# Patient Record
Sex: MALE | Race: BLACK OR AFRICAN AMERICAN | NOT HISPANIC OR LATINO | URBAN - METROPOLITAN AREA
[De-identification: names, ages, dates, MRNs, and addresses within clinical notes are randomized per-mention and may not be internally consistent; named-entity substitution may affect disease eponyms.]

---

## 2023-10-31 ENCOUNTER — INPATIENT (INPATIENT)
Facility: HOSPITAL | Age: 28
LOS: 9 days | Discharge: ANOTHER HOSPITAL | End: 2023-11-10
Attending: INTERNAL MEDICINE | Admitting: INTERNAL MEDICINE
Payer: OTHER GOVERNMENT

## 2023-10-31 VITALS
DIASTOLIC BLOOD PRESSURE: 93 MMHG | TEMPERATURE: 100 F | HEART RATE: 114 BPM | HEIGHT: 72 IN | WEIGHT: 220.02 LBS | RESPIRATION RATE: 19 BRPM | OXYGEN SATURATION: 97 % | SYSTOLIC BLOOD PRESSURE: 180 MMHG

## 2023-10-31 LAB
ALBUMIN SERPL ELPH-MCNC: 3.8 G/DL — SIGNIFICANT CHANGE UP (ref 3.3–5)
ALBUMIN SERPL ELPH-MCNC: 3.8 G/DL — SIGNIFICANT CHANGE UP (ref 3.3–5)
ALP SERPL-CCNC: 57 U/L — SIGNIFICANT CHANGE UP (ref 40–120)
ALP SERPL-CCNC: 57 U/L — SIGNIFICANT CHANGE UP (ref 40–120)
ALT FLD-CCNC: 87 U/L — HIGH (ref 12–78)
ALT FLD-CCNC: 87 U/L — HIGH (ref 12–78)
ANION GAP SERPL CALC-SCNC: 8 MMOL/L — SIGNIFICANT CHANGE UP (ref 5–17)
ANION GAP SERPL CALC-SCNC: 8 MMOL/L — SIGNIFICANT CHANGE UP (ref 5–17)
AST SERPL-CCNC: 32 U/L — SIGNIFICANT CHANGE UP (ref 15–37)
AST SERPL-CCNC: 32 U/L — SIGNIFICANT CHANGE UP (ref 15–37)
BASOPHILS # BLD AUTO: 0.04 K/UL — SIGNIFICANT CHANGE UP (ref 0–0.2)
BASOPHILS # BLD AUTO: 0.04 K/UL — SIGNIFICANT CHANGE UP (ref 0–0.2)
BASOPHILS NFR BLD AUTO: 0.3 % — SIGNIFICANT CHANGE UP (ref 0–2)
BASOPHILS NFR BLD AUTO: 0.3 % — SIGNIFICANT CHANGE UP (ref 0–2)
BILIRUB SERPL-MCNC: 0.2 MG/DL — SIGNIFICANT CHANGE UP (ref 0.2–1.2)
BILIRUB SERPL-MCNC: 0.2 MG/DL — SIGNIFICANT CHANGE UP (ref 0.2–1.2)
BUN SERPL-MCNC: 12 MG/DL — SIGNIFICANT CHANGE UP (ref 7–23)
BUN SERPL-MCNC: 12 MG/DL — SIGNIFICANT CHANGE UP (ref 7–23)
CALCIUM SERPL-MCNC: 9.1 MG/DL — SIGNIFICANT CHANGE UP (ref 8.5–10.1)
CALCIUM SERPL-MCNC: 9.1 MG/DL — SIGNIFICANT CHANGE UP (ref 8.5–10.1)
CHLORIDE SERPL-SCNC: 101 MMOL/L — SIGNIFICANT CHANGE UP (ref 96–108)
CHLORIDE SERPL-SCNC: 101 MMOL/L — SIGNIFICANT CHANGE UP (ref 96–108)
CO2 SERPL-SCNC: 30 MMOL/L — SIGNIFICANT CHANGE UP (ref 22–31)
CO2 SERPL-SCNC: 30 MMOL/L — SIGNIFICANT CHANGE UP (ref 22–31)
CREAT SERPL-MCNC: 1.35 MG/DL — HIGH (ref 0.5–1.3)
CREAT SERPL-MCNC: 1.35 MG/DL — HIGH (ref 0.5–1.3)
EGFR: 73 ML/MIN/1.73M2 — SIGNIFICANT CHANGE UP
EGFR: 73 ML/MIN/1.73M2 — SIGNIFICANT CHANGE UP
EOSINOPHIL # BLD AUTO: 0 K/UL — SIGNIFICANT CHANGE UP (ref 0–0.5)
EOSINOPHIL # BLD AUTO: 0 K/UL — SIGNIFICANT CHANGE UP (ref 0–0.5)
EOSINOPHIL NFR BLD AUTO: 0 % — SIGNIFICANT CHANGE UP (ref 0–6)
EOSINOPHIL NFR BLD AUTO: 0 % — SIGNIFICANT CHANGE UP (ref 0–6)
GLUCOSE SERPL-MCNC: 100 MG/DL — HIGH (ref 70–99)
GLUCOSE SERPL-MCNC: 100 MG/DL — HIGH (ref 70–99)
HCT VFR BLD CALC: 45 % — SIGNIFICANT CHANGE UP (ref 39–50)
HCT VFR BLD CALC: 45 % — SIGNIFICANT CHANGE UP (ref 39–50)
HGB BLD-MCNC: 14 G/DL — SIGNIFICANT CHANGE UP (ref 13–17)
HGB BLD-MCNC: 14 G/DL — SIGNIFICANT CHANGE UP (ref 13–17)
IMM GRANULOCYTES NFR BLD AUTO: 0.8 % — SIGNIFICANT CHANGE UP (ref 0–0.9)
IMM GRANULOCYTES NFR BLD AUTO: 0.8 % — SIGNIFICANT CHANGE UP (ref 0–0.9)
LYMPHOCYTES # BLD AUTO: 1.3 K/UL — SIGNIFICANT CHANGE UP (ref 1–3.3)
LYMPHOCYTES # BLD AUTO: 1.3 K/UL — SIGNIFICANT CHANGE UP (ref 1–3.3)
LYMPHOCYTES # BLD AUTO: 10.2 % — LOW (ref 13–44)
LYMPHOCYTES # BLD AUTO: 10.2 % — LOW (ref 13–44)
MCHC RBC-ENTMCNC: 26.4 PG — LOW (ref 27–34)
MCHC RBC-ENTMCNC: 26.4 PG — LOW (ref 27–34)
MCHC RBC-ENTMCNC: 31.1 G/DL — LOW (ref 32–36)
MCHC RBC-ENTMCNC: 31.1 G/DL — LOW (ref 32–36)
MCV RBC AUTO: 84.7 FL — SIGNIFICANT CHANGE UP (ref 80–100)
MCV RBC AUTO: 84.7 FL — SIGNIFICANT CHANGE UP (ref 80–100)
MONOCYTES # BLD AUTO: 1.3 K/UL — HIGH (ref 0–0.9)
MONOCYTES # BLD AUTO: 1.3 K/UL — HIGH (ref 0–0.9)
MONOCYTES NFR BLD AUTO: 10.2 % — SIGNIFICANT CHANGE UP (ref 2–14)
MONOCYTES NFR BLD AUTO: 10.2 % — SIGNIFICANT CHANGE UP (ref 2–14)
NEUTROPHILS # BLD AUTO: 10.02 K/UL — HIGH (ref 1.8–7.4)
NEUTROPHILS # BLD AUTO: 10.02 K/UL — HIGH (ref 1.8–7.4)
NEUTROPHILS NFR BLD AUTO: 78.5 % — HIGH (ref 43–77)
NEUTROPHILS NFR BLD AUTO: 78.5 % — HIGH (ref 43–77)
NRBC # BLD: 0 /100 WBCS — SIGNIFICANT CHANGE UP (ref 0–0)
NRBC # BLD: 0 /100 WBCS — SIGNIFICANT CHANGE UP (ref 0–0)
PLATELET # BLD AUTO: 290 K/UL — SIGNIFICANT CHANGE UP (ref 150–400)
PLATELET # BLD AUTO: 290 K/UL — SIGNIFICANT CHANGE UP (ref 150–400)
POTASSIUM SERPL-MCNC: 4.4 MMOL/L — SIGNIFICANT CHANGE UP (ref 3.5–5.3)
POTASSIUM SERPL-MCNC: 4.4 MMOL/L — SIGNIFICANT CHANGE UP (ref 3.5–5.3)
POTASSIUM SERPL-SCNC: 4.4 MMOL/L — SIGNIFICANT CHANGE UP (ref 3.5–5.3)
POTASSIUM SERPL-SCNC: 4.4 MMOL/L — SIGNIFICANT CHANGE UP (ref 3.5–5.3)
PROT SERPL-MCNC: 7.5 GM/DL — SIGNIFICANT CHANGE UP (ref 6–8.3)
PROT SERPL-MCNC: 7.5 GM/DL — SIGNIFICANT CHANGE UP (ref 6–8.3)
RBC # BLD: 5.31 M/UL — SIGNIFICANT CHANGE UP (ref 4.2–5.8)
RBC # BLD: 5.31 M/UL — SIGNIFICANT CHANGE UP (ref 4.2–5.8)
RBC # FLD: 15.4 % — HIGH (ref 10.3–14.5)
RBC # FLD: 15.4 % — HIGH (ref 10.3–14.5)
SODIUM SERPL-SCNC: 139 MMOL/L — SIGNIFICANT CHANGE UP (ref 135–145)
SODIUM SERPL-SCNC: 139 MMOL/L — SIGNIFICANT CHANGE UP (ref 135–145)
VALPROATE SERPL-MCNC: 85 UG/ML — SIGNIFICANT CHANGE UP (ref 50–100)
VALPROATE SERPL-MCNC: 85 UG/ML — SIGNIFICANT CHANGE UP (ref 50–100)
WBC # BLD: 12.76 K/UL — HIGH (ref 3.8–10.5)
WBC # BLD: 12.76 K/UL — HIGH (ref 3.8–10.5)
WBC # FLD AUTO: 12.76 K/UL — HIGH (ref 3.8–10.5)
WBC # FLD AUTO: 12.76 K/UL — HIGH (ref 3.8–10.5)

## 2023-10-31 PROCEDURE — 93010 ELECTROCARDIOGRAM REPORT: CPT

## 2023-10-31 PROCEDURE — 99285 EMERGENCY DEPT VISIT HI MDM: CPT

## 2023-10-31 PROCEDURE — 99222 1ST HOSP IP/OBS MODERATE 55: CPT

## 2023-10-31 PROCEDURE — 70450 CT HEAD/BRAIN W/O DYE: CPT | Mod: 26,MA

## 2023-10-31 RX ORDER — ACETAMINOPHEN 500 MG
650 TABLET ORAL EVERY 6 HOURS
Refills: 0 | Status: DISCONTINUED | OUTPATIENT
Start: 2023-10-31 | End: 2023-11-03

## 2023-10-31 RX ORDER — LANOLIN ALCOHOL/MO/W.PET/CERES
3 CREAM (GRAM) TOPICAL AT BEDTIME
Refills: 0 | Status: DISCONTINUED | OUTPATIENT
Start: 2023-10-31 | End: 2023-11-10

## 2023-10-31 RX ORDER — SODIUM CHLORIDE 9 MG/ML
1000 INJECTION INTRAMUSCULAR; INTRAVENOUS; SUBCUTANEOUS
Refills: 0 | Status: DISCONTINUED | OUTPATIENT
Start: 2023-10-31 | End: 2023-11-02

## 2023-10-31 RX ORDER — ENOXAPARIN SODIUM 100 MG/ML
40 INJECTION SUBCUTANEOUS EVERY 24 HOURS
Refills: 0 | Status: DISCONTINUED | OUTPATIENT
Start: 2023-10-31 | End: 2023-11-03

## 2023-10-31 RX ORDER — SODIUM CHLORIDE 9 MG/ML
1000 INJECTION INTRAMUSCULAR; INTRAVENOUS; SUBCUTANEOUS ONCE
Refills: 0 | Status: COMPLETED | OUTPATIENT
Start: 2023-10-31 | End: 2023-10-31

## 2023-10-31 RX ORDER — ONDANSETRON 8 MG/1
4 TABLET, FILM COATED ORAL EVERY 8 HOURS
Refills: 0 | Status: DISCONTINUED | OUTPATIENT
Start: 2023-10-31 | End: 2023-11-02

## 2023-10-31 RX ADMIN — SODIUM CHLORIDE 1000 MILLILITER(S): 9 INJECTION INTRAMUSCULAR; INTRAVENOUS; SUBCUTANEOUS at 22:28

## 2023-10-31 RX ADMIN — SODIUM CHLORIDE 1000 MILLILITER(S): 9 INJECTION INTRAMUSCULAR; INTRAVENOUS; SUBCUTANEOUS at 21:30

## 2023-10-31 NOTE — H&P ADULT - NSHPPHYSICALEXAM_GEN_ALL_CORE
PHYSICAL EXAM:  GENERAL: NAD, lying in bed comfortably  HEAD:  Atraumatic, Normocephalic  EYES: EOMI, PERRLA, conjunctiva and sclera clear  ENT: Moist mucous membranes  NECK: Supple, No JVD  CHEST/LUNG: Clear to auscultation bilaterally; No rales, rhonchi, wheezing, or rubs. Unlabored respirations  HEART: Regular rate and rhythm; No murmurs, rubs, or gallops  ABDOMEN: Bowel sounds present; Soft, Nontender, Nondistended. No hepatomegally  EXTREMITIES:  2+ Peripheral Pulses, brisk capillary refill. No clubbing, cyanosis, or edema  NERVOUS SYSTEM:  Alert & Oriented X2, speech clear. No deficits   MSK: FROM all 4 extremities, full and equal strength  SKIN: No rashes or lesions

## 2023-10-31 NOTE — ED ADULT NURSE NOTE - CHIEF COMPLAINT QUOTE
as per EMS " coming from home, seizures x 5  today, denies head injury, complaint with medications. Witnessed last seizure by EMS lasted 10 mins. Given versed 5mg IM and placed left 20g hand." [ Patient postictal in triage, noted no respiratory distress in triage. hx of seizures]

## 2023-10-31 NOTE — ED ADULT NURSE NOTE - NSFALLUNIVINTERV_ED_ALL_ED
Bed/Stretcher in lowest position, wheels locked, appropriate side rails in place/Call bell, personal items and telephone in reach/Instruct patient to call for assistance before getting out of bed/chair/stretcher/Non-slip footwear applied when patient is off stretcher/[place] to call system/Physically safe environment - no spills, clutter or unnecessary equipment/Purposeful proactive rounding/Room/bathroom lighting operational, light cord in reach

## 2023-10-31 NOTE — H&P ADULT - ASSESSMENT
Patient's aunt sitting at bedside. Patient is still in some post-ictal state, but he is able to answer some of the simple questions. Reportedly, he had tonic-clonic seizures x5 at home followed by post ictal state. No reported tongue bite or bowel/bladder incontinence, no head strike. His seizure had last for about 19 minutes. Had received versed 5mg IM in route by EMS. He denies headache, vomiting, neck pain or stiffness. Aunt says that his Brivaracetam has been recently changed with Oxcarbazepine 300 mg bid. He has been diagnosed with encephalitis and he is antibiotics and prednisone per his aunt. Patient is compliant on his all medications. Otherwise, he denies fever, chills, chest pain, sob, N/V, abdominal pain, diarrhea or dysuria.   He does vape but denies alcohol or substance abuse.      # Breakthrough seizure.  Valproic level 85 (normal range ) and Oxcarbazepine level pending.  CT head negative.  Admit to telemetry.  Vitals and neuro-checks per protocol.  Seizure precaution.  Follow Oxcarbazepine level.  EEG in am.  Continue his Depakote 750 mg bid, Vimpat 150 mg bid, Oxcarbazepine 300 mg bid.  Consult neurology in am.    # H/o recent Encephalitis.  Continue his Clarithromycin 500 mg bid, Amoxicillin 1000 mg bid, Bactrim-DS (800-160) 1 tab on Mon/Wed/Friday (3 days in a week). Prednisone 15 mg po daily.  Clarithromycin  is not available in in-patient pharmacy.  Need to bring his own medication in am.    # Mild LUIS CARLOS, likely dehydration.  Adequate hydration.  BMP in am.    # Mildly elevated ALT.  Denies alcohol abuse.  Will get acute hepatitis panel.    # GERD.  On Pantoprazole.    # DVT prophylaxis: Lovenox sq daily.    Plan of care d/w his aunt at bedside in detail, and she verbalized understanding.

## 2023-10-31 NOTE — ED ADULT TRIAGE NOTE - CHIEF COMPLAINT QUOTE
as per EMS " coming from home, seizures x 5  today, denies head injury, complaint with medications. Witnessed last seizure by EMS lasted 10 mins. Given versed 5mg IM and placed left 20g hand." [ Patient postictal in triage, noted no respiratory distress in triage. hx of seizures]
none

## 2023-10-31 NOTE — ED ADULT NURSE NOTE - OBJECTIVE STATEMENT
as per uncle pt was home and experienced seizure like activity @ around 1826. pt was standing when the seizure occurred and was placed into a chair by his uncle. pts uncle is unsure how long the seizure lasted. pt is compliant with seizure medication, but uncle stated there was a change in his medication and whenever his medication is changed he has a seizure.    unsure of pts PMH

## 2023-10-31 NOTE — ED PROVIDER NOTE - CLINICAL SUMMARY MEDICAL DECISION MAKING FREE TEXT BOX
26yo male with pmh encephalitis on prednisone, seizures, presenting with seizure.  Brought in by uncle.  Around 6p patient reportedly had gtc seizure witnessed by uncle.  Patient was standing and uncle saw eyes rolling back so he assisted him into chair where he seized.  Did not fall or hit head.  Seemed consistent with prior seizures.  Still postictal at this time, not responding to questions and history coming from uncle and aunt.  Thursday brivaracetam was unavailable at pharmacy so was switched to oxcarbazepine which family endorses compliance with.  Also takes lacosamide and depakote.  No fevers, was feeling well prior to this.  Will ct head for prolonged postictal state, check labs and levels.  Reassess and dispo per results and reassessments.

## 2023-10-31 NOTE — ED PROVIDER NOTE - PHYSICAL EXAMINATION
General appearance: Nontoxic appearing, conversant, afebrile    Eyes: anicteric sclerae, BELKIS, EOMI   HENT: Atraumatic; oropharynx clear, MMM and no ulcerations, no pharyngeal erythema or exudate   Neck: Trachea midline; Full range of motion, supple   Pulm: CTA bl, normal respiratory effort and no intercostal retractions, normal work of breathing   CV: RRR, No murmurs, rubs, or gallops   Abdomen: Soft, non-tender, non-distended; no guarding or rebound   Extremities: No peripheral edema, no gross deformities, FROM x4   Skin: Dry, normal temperature, turgor and texture; no rash   Psych: Appropriate affect, cooperative    Neuro: CN2-12 grossly intact, moving all extremities

## 2023-10-31 NOTE — ED ADULT NURSE NOTE - ED STAT RN HANDOFF DETAILS
Report endorsed to ED Hold RN luba Ndiaye. Safety checks compld this shift/Safety rounds completed hourly.  IV sites checked Q2+remains WDL. Meds given as ord with no s/s of adverse RXNs. Fall +skin precs in place. Any issues endorsed to oncoming RN for follow up.  ]

## 2023-10-31 NOTE — ED PROVIDER NOTE - PROGRESS NOTE DETAILS
Still postictal but has been improving.  Discussed with aunt at bedside and offered admission for prolonged postictal state and breakthrough seizures with recent med change, would prefer admission.

## 2023-10-31 NOTE — H&P ADULT - HISTORY OF PRESENT ILLNESS
Chief Complaints: Seizure.    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 Patient's aunt sitting at bedside. Patient is still in some post-ictal state, but he is able to answer some of the simple questions. Reportedly, he had tonic-clonic seizures x5 at home followed by post ictal state. No reported tongue bite or bowel/bladder incontinence, no head strike. His seizure had last for about 19 minutes. Had received versed 5mg IM in route by EMS. He denies headache, vomiting, neck pain or stiffness. Aunt says that his Brivaracetam has been recently changed with Oxcarbazepine 300 mg bid. He has been diagnosed with encephalitis and he is antibiotics and prednisone per his aunt. Patient is compliant on his all medications. Otherwise, he denies fever, chills, chest pain, sob, N/V, abdominal pain, diarrhea or dysuria.  He does vape but denies alcohol or substance abuse.  Sig labs: WBC 12.76k, N 78%, BUN 12, Cr 1.35, ALT 87, Valproic level 85 (normal range ) and Oxcarbazepine level pending.  CT head negative.    His current home medications per his aunt:  Depakote 750 mg bid  Vimpat 150 mg bid  Oxcarbazepine 300 mg bid  Clarithromycin 500 mg bid  Amoxicillin 1000 mg bid  Bactrim-DS (800-160) 1 tab on Mon/Wed/Friday (3 days in a week).  Prednisone 15 mg po daily.  Pantoprazole 40 mg daily.  Vit B12 1000 IU on Mon/Wed/Friday (3 days in a week).

## 2023-10-31 NOTE — H&P ADULT - NSHPLABSRESULTS_GEN_ALL_CORE
LABS:                        14.0   12.76 )-----------( 290      ( 31 Oct 2023 21:14 )             45.0     10-31    139  |  101  |  12  ----------------------------<  100<H>  4.4   |  30  |  1.35<H>    Ca    9.1      31 Oct 2023 21:14    TPro  7.5  /  Alb  3.8  /  TBili  0.2  /  DBili  x   /  AST  32  /  ALT  87<H>  /  AlkPhos  57  10-31        < from: CT Head No Cont (10.31.23 @ 20:55) >        IMPRESSION:    No acute intracranial bleeding.    --- End of Report ---    < end of copied text >

## 2023-11-01 DIAGNOSIS — Z90.89 ACQUIRED ABSENCE OF OTHER ORGANS: Chronic | ICD-10-CM

## 2023-11-01 DIAGNOSIS — N17.9 ACUTE KIDNEY FAILURE, UNSPECIFIED: ICD-10-CM

## 2023-11-01 DIAGNOSIS — Z29.9 ENCOUNTER FOR PROPHYLACTIC MEASURES, UNSPECIFIED: ICD-10-CM

## 2023-11-01 DIAGNOSIS — R45.1 RESTLESSNESS AND AGITATION: ICD-10-CM

## 2023-11-01 DIAGNOSIS — G04.90 ENCEPHALITIS AND ENCEPHALOMYELITIS, UNSPECIFIED: ICD-10-CM

## 2023-11-01 DIAGNOSIS — R56.9 UNSPECIFIED CONVULSIONS: ICD-10-CM

## 2023-11-01 DIAGNOSIS — R79.89 OTHER SPECIFIED ABNORMAL FINDINGS OF BLOOD CHEMISTRY: ICD-10-CM

## 2023-11-01 LAB
ALBUMIN SERPL ELPH-MCNC: 3.4 G/DL — SIGNIFICANT CHANGE UP (ref 3.3–5)
ALBUMIN SERPL ELPH-MCNC: 3.4 G/DL — SIGNIFICANT CHANGE UP (ref 3.3–5)
ALP SERPL-CCNC: 53 U/L — SIGNIFICANT CHANGE UP (ref 40–120)
ALP SERPL-CCNC: 53 U/L — SIGNIFICANT CHANGE UP (ref 40–120)
ALT FLD-CCNC: 84 U/L — HIGH (ref 12–78)
ALT FLD-CCNC: 84 U/L — HIGH (ref 12–78)
ANION GAP SERPL CALC-SCNC: 6 MMOL/L — SIGNIFICANT CHANGE UP (ref 5–17)
ANION GAP SERPL CALC-SCNC: 6 MMOL/L — SIGNIFICANT CHANGE UP (ref 5–17)
APPEARANCE UR: ABNORMAL
APPEARANCE UR: ABNORMAL
AST SERPL-CCNC: 46 U/L — HIGH (ref 15–37)
AST SERPL-CCNC: 46 U/L — HIGH (ref 15–37)
BACTERIA # UR AUTO: ABNORMAL /HPF
BACTERIA # UR AUTO: ABNORMAL /HPF
BILIRUB SERPL-MCNC: 0.4 MG/DL — SIGNIFICANT CHANGE UP (ref 0.2–1.2)
BILIRUB SERPL-MCNC: 0.4 MG/DL — SIGNIFICANT CHANGE UP (ref 0.2–1.2)
BILIRUB UR-MCNC: NEGATIVE — SIGNIFICANT CHANGE UP
BILIRUB UR-MCNC: NEGATIVE — SIGNIFICANT CHANGE UP
BUN SERPL-MCNC: 12 MG/DL — SIGNIFICANT CHANGE UP (ref 7–23)
BUN SERPL-MCNC: 12 MG/DL — SIGNIFICANT CHANGE UP (ref 7–23)
CALCIUM SERPL-MCNC: 9 MG/DL — SIGNIFICANT CHANGE UP (ref 8.5–10.1)
CALCIUM SERPL-MCNC: 9 MG/DL — SIGNIFICANT CHANGE UP (ref 8.5–10.1)
CHLORIDE SERPL-SCNC: 107 MMOL/L — SIGNIFICANT CHANGE UP (ref 96–108)
CHLORIDE SERPL-SCNC: 107 MMOL/L — SIGNIFICANT CHANGE UP (ref 96–108)
CK SERPL-CCNC: 301 U/L — SIGNIFICANT CHANGE UP (ref 26–308)
CK SERPL-CCNC: 301 U/L — SIGNIFICANT CHANGE UP (ref 26–308)
CO2 SERPL-SCNC: 28 MMOL/L — SIGNIFICANT CHANGE UP (ref 22–31)
CO2 SERPL-SCNC: 28 MMOL/L — SIGNIFICANT CHANGE UP (ref 22–31)
COLOR SPEC: YELLOW — SIGNIFICANT CHANGE UP
COLOR SPEC: YELLOW — SIGNIFICANT CHANGE UP
CREAT SERPL-MCNC: 1.37 MG/DL — HIGH (ref 0.5–1.3)
CREAT SERPL-MCNC: 1.37 MG/DL — HIGH (ref 0.5–1.3)
DIFF PNL FLD: ABNORMAL
DIFF PNL FLD: ABNORMAL
EGFR: 71 ML/MIN/1.73M2 — SIGNIFICANT CHANGE UP
EGFR: 71 ML/MIN/1.73M2 — SIGNIFICANT CHANGE UP
EPI CELLS # UR: PRESENT
EPI CELLS # UR: PRESENT
GLUCOSE SERPL-MCNC: 114 MG/DL — HIGH (ref 70–99)
GLUCOSE SERPL-MCNC: 114 MG/DL — HIGH (ref 70–99)
GLUCOSE UR QL: NEGATIVE MG/DL — SIGNIFICANT CHANGE UP
GLUCOSE UR QL: NEGATIVE MG/DL — SIGNIFICANT CHANGE UP
HAV IGM SER-ACNC: SIGNIFICANT CHANGE UP
HAV IGM SER-ACNC: SIGNIFICANT CHANGE UP
HBV CORE IGM SER-ACNC: SIGNIFICANT CHANGE UP
HBV CORE IGM SER-ACNC: SIGNIFICANT CHANGE UP
HBV SURFACE AG SER-ACNC: SIGNIFICANT CHANGE UP
HBV SURFACE AG SER-ACNC: SIGNIFICANT CHANGE UP
HCT VFR BLD CALC: 44.5 % — SIGNIFICANT CHANGE UP (ref 39–50)
HCT VFR BLD CALC: 44.5 % — SIGNIFICANT CHANGE UP (ref 39–50)
HCV AB S/CO SERPL IA: 0.06 S/CO — SIGNIFICANT CHANGE UP (ref 0–0.99)
HCV AB S/CO SERPL IA: 0.06 S/CO — SIGNIFICANT CHANGE UP (ref 0–0.99)
HCV AB SERPL-IMP: SIGNIFICANT CHANGE UP
HCV AB SERPL-IMP: SIGNIFICANT CHANGE UP
HGB BLD-MCNC: 14.1 G/DL — SIGNIFICANT CHANGE UP (ref 13–17)
HGB BLD-MCNC: 14.1 G/DL — SIGNIFICANT CHANGE UP (ref 13–17)
KETONES UR-MCNC: NEGATIVE MG/DL — SIGNIFICANT CHANGE UP
KETONES UR-MCNC: NEGATIVE MG/DL — SIGNIFICANT CHANGE UP
LEUKOCYTE ESTERASE UR-ACNC: ABNORMAL
LEUKOCYTE ESTERASE UR-ACNC: ABNORMAL
MAGNESIUM SERPL-MCNC: 2.2 MG/DL — SIGNIFICANT CHANGE UP (ref 1.6–2.6)
MAGNESIUM SERPL-MCNC: 2.2 MG/DL — SIGNIFICANT CHANGE UP (ref 1.6–2.6)
MCHC RBC-ENTMCNC: 26.9 PG — LOW (ref 27–34)
MCHC RBC-ENTMCNC: 26.9 PG — LOW (ref 27–34)
MCHC RBC-ENTMCNC: 31.7 G/DL — LOW (ref 32–36)
MCHC RBC-ENTMCNC: 31.7 G/DL — LOW (ref 32–36)
MCV RBC AUTO: 84.9 FL — SIGNIFICANT CHANGE UP (ref 80–100)
MCV RBC AUTO: 84.9 FL — SIGNIFICANT CHANGE UP (ref 80–100)
NITRITE UR-MCNC: POSITIVE
NITRITE UR-MCNC: POSITIVE
NRBC # BLD: 0 /100 WBCS — SIGNIFICANT CHANGE UP (ref 0–0)
NRBC # BLD: 0 /100 WBCS — SIGNIFICANT CHANGE UP (ref 0–0)
PH UR: 6.5 — SIGNIFICANT CHANGE UP (ref 5–8)
PH UR: 6.5 — SIGNIFICANT CHANGE UP (ref 5–8)
PLATELET # BLD AUTO: 306 K/UL — SIGNIFICANT CHANGE UP (ref 150–400)
PLATELET # BLD AUTO: 306 K/UL — SIGNIFICANT CHANGE UP (ref 150–400)
POTASSIUM SERPL-MCNC: 3.8 MMOL/L — SIGNIFICANT CHANGE UP (ref 3.5–5.3)
POTASSIUM SERPL-MCNC: 3.8 MMOL/L — SIGNIFICANT CHANGE UP (ref 3.5–5.3)
POTASSIUM SERPL-SCNC: 3.8 MMOL/L — SIGNIFICANT CHANGE UP (ref 3.5–5.3)
POTASSIUM SERPL-SCNC: 3.8 MMOL/L — SIGNIFICANT CHANGE UP (ref 3.5–5.3)
PROLACTIN SERPL-MCNC: 25.2 NG/ML — HIGH (ref 4.1–18.4)
PROLACTIN SERPL-MCNC: 25.2 NG/ML — HIGH (ref 4.1–18.4)
PROT SERPL-MCNC: 7.3 GM/DL — SIGNIFICANT CHANGE UP (ref 6–8.3)
PROT SERPL-MCNC: 7.3 GM/DL — SIGNIFICANT CHANGE UP (ref 6–8.3)
PROT UR-MCNC: 30 MG/DL
PROT UR-MCNC: 30 MG/DL
RBC # BLD: 5.24 M/UL — SIGNIFICANT CHANGE UP (ref 4.2–5.8)
RBC # BLD: 5.24 M/UL — SIGNIFICANT CHANGE UP (ref 4.2–5.8)
RBC # FLD: 15.7 % — HIGH (ref 10.3–14.5)
RBC # FLD: 15.7 % — HIGH (ref 10.3–14.5)
RBC CASTS # UR COMP ASSIST: PRESENT /HPF
RBC CASTS # UR COMP ASSIST: PRESENT /HPF
SODIUM SERPL-SCNC: 141 MMOL/L — SIGNIFICANT CHANGE UP (ref 135–145)
SODIUM SERPL-SCNC: 141 MMOL/L — SIGNIFICANT CHANGE UP (ref 135–145)
SP GR SPEC: 1.03 — SIGNIFICANT CHANGE UP (ref 1–1.03)
SP GR SPEC: 1.03 — SIGNIFICANT CHANGE UP (ref 1–1.03)
UROBILINOGEN FLD QL: 0.2 MG/DL — SIGNIFICANT CHANGE UP (ref 0.2–1)
UROBILINOGEN FLD QL: 0.2 MG/DL — SIGNIFICANT CHANGE UP (ref 0.2–1)
WBC # BLD: 9.6 K/UL — SIGNIFICANT CHANGE UP (ref 3.8–10.5)
WBC # BLD: 9.6 K/UL — SIGNIFICANT CHANGE UP (ref 3.8–10.5)
WBC # FLD AUTO: 9.6 K/UL — SIGNIFICANT CHANGE UP (ref 3.8–10.5)
WBC # FLD AUTO: 9.6 K/UL — SIGNIFICANT CHANGE UP (ref 3.8–10.5)
WBC UR QL: >25 /HPF — SIGNIFICANT CHANGE UP (ref 0–5)
WBC UR QL: >25 /HPF — SIGNIFICANT CHANGE UP (ref 0–5)

## 2023-11-01 PROCEDURE — 99233 SBSQ HOSP IP/OBS HIGH 50: CPT

## 2023-11-01 RX ORDER — DIVALPROEX SODIUM 500 MG/1
750 TABLET, DELAYED RELEASE ORAL
Refills: 0 | Status: DISCONTINUED | OUTPATIENT
Start: 2023-11-01 | End: 2023-11-10

## 2023-11-01 RX ORDER — HALOPERIDOL DECANOATE 100 MG/ML
2 INJECTION INTRAMUSCULAR ONCE
Refills: 0 | Status: COMPLETED | OUTPATIENT
Start: 2023-11-01 | End: 2023-11-01

## 2023-11-01 RX ORDER — PREGABALIN 225 MG/1
1000 CAPSULE ORAL DAILY
Refills: 0 | Status: DISCONTINUED | OUTPATIENT
Start: 2023-11-01 | End: 2023-11-10

## 2023-11-01 RX ORDER — AMOXICILLIN 250 MG/5ML
1000 SUSPENSION, RECONSTITUTED, ORAL (ML) ORAL
Refills: 0 | Status: DISCONTINUED | OUTPATIENT
Start: 2023-11-01 | End: 2023-11-02

## 2023-11-01 RX ORDER — OXCARBAZEPINE 300 MG/1
300 TABLET, FILM COATED ORAL
Refills: 0 | Status: DISCONTINUED | OUTPATIENT
Start: 2023-11-01 | End: 2023-11-10

## 2023-11-01 RX ORDER — CLARITHROMYCIN 500 MG
500 TABLET ORAL
Refills: 0 | Status: DISCONTINUED | OUTPATIENT
Start: 2023-11-01 | End: 2023-11-02

## 2023-11-01 RX ORDER — HALOPERIDOL DECANOATE 100 MG/ML
2 INJECTION INTRAMUSCULAR EVERY 6 HOURS
Refills: 0 | Status: DISCONTINUED | OUTPATIENT
Start: 2023-11-01 | End: 2023-11-04

## 2023-11-01 RX ORDER — BRIVARACETAM 25 MG/1
100 TABLET, FILM COATED ORAL
Refills: 0 | Status: DISCONTINUED | OUTPATIENT
Start: 2023-11-01 | End: 2023-11-10

## 2023-11-01 RX ORDER — PANTOPRAZOLE SODIUM 20 MG/1
40 TABLET, DELAYED RELEASE ORAL
Refills: 0 | Status: DISCONTINUED | OUTPATIENT
Start: 2023-11-01 | End: 2023-11-10

## 2023-11-01 RX ORDER — LACOSAMIDE 50 MG/1
150 TABLET ORAL
Refills: 0 | Status: DISCONTINUED | OUTPATIENT
Start: 2023-11-01 | End: 2023-11-10

## 2023-11-01 RX ADMIN — OXCARBAZEPINE 300 MILLIGRAM(S): 300 TABLET, FILM COATED ORAL at 05:44

## 2023-11-01 RX ADMIN — SODIUM CHLORIDE 125 MILLILITER(S): 9 INJECTION INTRAMUSCULAR; INTRAVENOUS; SUBCUTANEOUS at 03:35

## 2023-11-01 RX ADMIN — Medication 500 MILLIGRAM(S): at 18:03

## 2023-11-01 RX ADMIN — BRIVARACETAM 100 MILLIGRAM(S): 25 TABLET, FILM COATED ORAL at 18:01

## 2023-11-01 RX ADMIN — OXCARBAZEPINE 300 MILLIGRAM(S): 300 TABLET, FILM COATED ORAL at 18:03

## 2023-11-01 RX ADMIN — HALOPERIDOL DECANOATE 2 MILLIGRAM(S): 100 INJECTION INTRAMUSCULAR at 16:15

## 2023-11-01 RX ADMIN — Medication 1000 MILLIGRAM(S): at 18:02

## 2023-11-01 RX ADMIN — PANTOPRAZOLE SODIUM 40 MILLIGRAM(S): 20 TABLET, DELAYED RELEASE ORAL at 05:44

## 2023-11-01 RX ADMIN — Medication 2 MILLIGRAM(S): at 07:53

## 2023-11-01 RX ADMIN — LACOSAMIDE 150 MILLIGRAM(S): 50 TABLET ORAL at 05:44

## 2023-11-01 RX ADMIN — Medication 1000 MILLIGRAM(S): at 05:44

## 2023-11-01 RX ADMIN — Medication 15 MILLIGRAM(S): at 05:44

## 2023-11-01 RX ADMIN — Medication 2 MILLIGRAM(S): at 08:53

## 2023-11-01 RX ADMIN — HALOPERIDOL DECANOATE 2 MILLIGRAM(S): 100 INJECTION INTRAMUSCULAR at 10:31

## 2023-11-01 RX ADMIN — LACOSAMIDE 150 MILLIGRAM(S): 50 TABLET ORAL at 18:03

## 2023-11-01 RX ADMIN — DIVALPROEX SODIUM 750 MILLIGRAM(S): 500 TABLET, DELAYED RELEASE ORAL at 05:44

## 2023-11-01 RX ADMIN — DIVALPROEX SODIUM 750 MILLIGRAM(S): 500 TABLET, DELAYED RELEASE ORAL at 18:02

## 2023-11-01 RX ADMIN — ENOXAPARIN SODIUM 40 MILLIGRAM(S): 100 INJECTION SUBCUTANEOUS at 05:44

## 2023-11-01 NOTE — CONSULT NOTE ADULT - ASSESSMENT
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

## 2023-11-01 NOTE — PROGRESS NOTE ADULT - SUBJECTIVE AND OBJECTIVE BOX
PROGRESS NOTE:     Patient is a 28y old  Male who presents with a chief complaint of Breakthrough seizure. (31 Oct 2023 22:43)      SUBJECTIVE / OVERNIGHT EVENTS: Pt confused and agitated.     ADDITIONAL REVIEW OF SYSTEMS:    MEDICATIONS  (STANDING):  amoxicillin 1000 milliGRAM(s) Oral two times a day  cyanocobalamin 1000 MICROGram(s) Oral daily  divalproex  milliGRAM(s) Oral two times a day  enoxaparin Injectable 40 milliGRAM(s) SubCutaneous every 24 hours  lacosamide 150 milliGRAM(s) Oral two times a day  OXcarbazepine 300 milliGRAM(s) Oral two times a day  pantoprazole    Tablet 40 milliGRAM(s) Oral before breakfast  predniSONE   Tablet 15 milliGRAM(s) Oral daily  sodium chloride 0.9%. 1000 milliLiter(s) (125 mL/Hr) IV Continuous <Continuous>  trimethoprim  160 mG/sulfamethoxazole 800 mG 1 Tablet(s) Oral <User Schedule>    MEDICATIONS  (PRN):  acetaminophen     Tablet .. 650 milliGRAM(s) Oral every 6 hours PRN Temp greater or equal to 38C (100.4F), Mild Pain (1 - 3)  aluminum hydroxide/magnesium hydroxide/simethicone Suspension 30 milliLiter(s) Oral every 4 hours PRN Dyspepsia  haloperidol    Injectable 2 milliGRAM(s) IntraMuscular every 6 hours PRN Agitation  LORazepam    IVPB 2 milliGRAM(s) IV Intermittent every 2 hours PRN Seizure  melatonin 3 milliGRAM(s) Oral at bedtime PRN Insomnia  ondansetron Injectable 4 milliGRAM(s) IV Push every 8 hours PRN Nausea and/or Vomiting      CAPILLARY BLOOD GLUCOSE      POCT Blood Glucose.: 94 mg/dL (31 Oct 2023 21:52)    I&O's Summary    01 Nov 2023 07:01  -  01 Nov 2023 14:03  --------------------------------------------------------  IN: 420 mL / OUT: 0 mL / NET: 420 mL        PHYSICAL EXAM:  Vital Signs Last 24 Hrs  T(C): 36.3 (01 Nov 2023 02:47), Max: 37.6 (31 Oct 2023 19:56)  T(F): 97.4 (01 Nov 2023 02:47), Max: 99.7 (31 Oct 2023 19:56)  HR: 68 (01 Nov 2023 07:54) (61 - 114)  BP: 117/82 (01 Nov 2023 07:54) (102/64 - 180/93)  BP(mean): --  RR: 18 (01 Nov 2023 07:54) (15 - 19)  SpO2: 99% (01 Nov 2023 07:54) (97% - 100%)    Parameters below as of 31 Oct 2023 22:46  Patient On (Oxygen Delivery Method): room air      GENERAL: Restless   EYES: EOMI, PERRLA, conjunctiva and sclera clear  ENT: Moist mucous membranes  NECK: Supple, No JVD  CHEST/LUNG: Clear to auscultation bilaterally; No rales, rhonchi, wheezing, or rubs. Unlabored respirations  HEART: Regular rate and rhythm; No murmurs, rubs, or gallops  ABDOMEN: Bowel sounds present; Soft, Nontender, Nondistended. No hepatomegally  EXTREMITIES:  2+ Peripheral Pulses, brisk capillary refill. No clubbing, cyanosis, or edema  NERVOUS SYSTEM:  Alert & Oriented X1-2, speech slurred   MSK: FROM all 4 extremities, full and equal strength  SKIN: No rashes or lesions    LABS:                        14.1   9.60  )-----------( 306      ( 01 Nov 2023 10:05 )             44.5     11-01    141  |  107  |  12  ----------------------------<  114<H>  3.8   |  28  |  1.37<H>    Ca    9.0      01 Nov 2023 10:05  Mg     2.2     11-01    TPro  7.3  /  Alb  3.4  /  TBili  0.4  /  DBili  x   /  AST  46<H>  /  ALT  84<H>  /  AlkPhos  53  11-01      CARDIAC MARKERS ( 01 Nov 2023 10:05 )  x     / x     / 301 U/L / x     / x          Urinalysis Basic - ( 01 Nov 2023 10:05 )    Color: x / Appearance: x / SG: x / pH: x  Gluc: 114 mg/dL / Ketone: x  / Bili: x / Urobili: x   Blood: x / Protein: x / Nitrite: x   Leuk Esterase: x / RBC: x / WBC x   Sq Epi: x / Non Sq Epi: x / Bacteria: x          RADIOLOGY & ADDITIONAL TESTS:  Results Reviewed:   Imaging Personally Reviewed:  Electrocardiogram Personally Reviewed:    COORDINATION OF CARE:  Care Discussed with Consultants/Other Providers [Y/N]:  Prior or Outpatient Records Reviewed [Y/N]:

## 2023-11-01 NOTE — CONSULT NOTE ADULT - SUBJECTIVE AND OBJECTIVE BOX
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 His aunt, Estuardo, provided the history. Prior to 12/2022, patient never had a seizure. His first seizure was while he was in [place] and had to be transported back to [place] [place]"" for care. He was put in a coma for 1 month. Patient follows with a neurologist in [place]. Last seizure was over the summer. As per aunt, Akilah came to visit her and he was perfectly fine (not ill, no cognitive impairment, focal weakness).     *Patient was switched from Briviact 100mg BID to Trileptal 300mg BID on 10/26/23 because the pharmacy did not have Briviact.    PMHx: ?autoimmune encephalitis, Epilepsy  PSHx: tonsillectomy  PFHx: none  Social Hx: Vapes, no etoh  Allergies: NKDA  ROS: unable to obtain due to AMS  Medications: see EMR    Vitals: Temp 99.7F       RR 19              /93  General: NAD  CV: regular rate and rhythm  Resp: no respiratory distress  Neck: supple  Neuro Exam: Somnolent. Opens eyes to voice. Mumbles. Follows simple commands. No dysarthria. EOM intact. No facial droop. PERRL. BTT. Moving all four extremities spontaneously against gravity. No focal weakness. Reflexes symmetric and toes down. Gait exam deferred. Sensory intact to touch.     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 His aunt, Estuardo, provided the history. Prior to 12/2022, patient never had a seizure. His first seizure was while he was in [place] and had to be transported back to [facility] for care. He was put in a coma for 1 month. Patient follows with a neurologist in [place]. Last seizure was over the summer. As per aunt, Akilah came to visit her and he was perfectly fine (not ill, no cognitive impairment, focal weakness).     *Patient was switched from Briviact 100mg BID to Trileptal 300mg BID on 10/26/23 because the pharmacy did not have Briviact.    PMHx: ?autoimmune encephalitis, Epilepsy  PSHx: tonsillectomy  PFHx: none  Social Hx: Vapes, no etoh  Allergies: NKDA  ROS: unable to obtain due to AMS  Medications: see EMR    Vitals: Temp 99.7F       RR 19              /93  General: NAD  CV: regular rate and rhythm  Resp: no respiratory distress  Neck: supple  Neuro Exam: Somnolent. Opens eyes to voice. Mumbles. Follows simple commands. No dysarthria. EOM intact. No facial droop. PERRL. BTT. Moving all four extremities spontaneously against gravity. No focal weakness. Reflexes symmetric and toes down. Gait exam deferred. Sensory intact to touch.     NIHSS- 11    CT head and labs reviewed

## 2023-11-01 NOTE — PROGRESS NOTE ADULT - ASSESSMENT
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 Currently post-ictal and agitated

## 2023-11-01 NOTE — PATIENT PROFILE ADULT - FUNCTIONAL SCREEN CURRENT LEVEL: COMMUNICATION, MLM
3 = unable to understand or speak (not related to language barrier) 2 = difficulty understanding and speaking (not related to language barrier)

## 2023-11-01 NOTE — PATIENT PROFILE ADULT - FALL HARM RISK - HARM RISK INTERVENTIONS
Assistance with ambulation/Assistance OOB with selected safe patient handling equipment/Communicate Risk of Fall with Harm to all staff/Discuss with provider need for PT consult/Monitor for mental status changes/Monitor gait and stability/Move patient closer to nurses' station/Reinforce activity limits and safety measures with patient and family/Reorient to person, place and time as needed/Tailored Fall Risk Interventions/Toileting schedule using arm’s reach rule for commode and bathroom/Use of alarms - bed, chair and/or voice tab/Visual Cue: Yellow wristband and red socks/Bed in lowest position, wheels locked, appropriate side rails in place/Call bell, personal items and telephone in reach/Instruct patient to call for assistance before getting out of bed or chair/Non-slip footwear when patient is out of bed/[place] to call system/Physically safe environment - no spills, clutter or unnecessary equipment/Purposeful Proactive Rounding/Room/bathroom lighting operational, light cord in reach

## 2023-11-02 LAB
ALBUMIN SERPL ELPH-MCNC: 3.4 G/DL — SIGNIFICANT CHANGE UP (ref 3.3–5)
ALBUMIN SERPL ELPH-MCNC: 3.4 G/DL — SIGNIFICANT CHANGE UP (ref 3.3–5)
ALP SERPL-CCNC: 56 U/L — SIGNIFICANT CHANGE UP (ref 40–120)
ALP SERPL-CCNC: 56 U/L — SIGNIFICANT CHANGE UP (ref 40–120)
ALT FLD-CCNC: 94 U/L — HIGH (ref 12–78)
ALT FLD-CCNC: 94 U/L — HIGH (ref 12–78)
ANION GAP SERPL CALC-SCNC: 5 MMOL/L — SIGNIFICANT CHANGE UP (ref 5–17)
ANION GAP SERPL CALC-SCNC: 5 MMOL/L — SIGNIFICANT CHANGE UP (ref 5–17)
AST SERPL-CCNC: 68 U/L — HIGH (ref 15–37)
AST SERPL-CCNC: 68 U/L — HIGH (ref 15–37)
BILIRUB SERPL-MCNC: 0.4 MG/DL — SIGNIFICANT CHANGE UP (ref 0.2–1.2)
BILIRUB SERPL-MCNC: 0.4 MG/DL — SIGNIFICANT CHANGE UP (ref 0.2–1.2)
BUN SERPL-MCNC: 11 MG/DL — SIGNIFICANT CHANGE UP (ref 7–23)
BUN SERPL-MCNC: 11 MG/DL — SIGNIFICANT CHANGE UP (ref 7–23)
CALCIUM SERPL-MCNC: 9.1 MG/DL — SIGNIFICANT CHANGE UP (ref 8.5–10.1)
CALCIUM SERPL-MCNC: 9.1 MG/DL — SIGNIFICANT CHANGE UP (ref 8.5–10.1)
CHLORIDE SERPL-SCNC: 106 MMOL/L — SIGNIFICANT CHANGE UP (ref 96–108)
CHLORIDE SERPL-SCNC: 106 MMOL/L — SIGNIFICANT CHANGE UP (ref 96–108)
CO2 SERPL-SCNC: 31 MMOL/L — SIGNIFICANT CHANGE UP (ref 22–31)
CO2 SERPL-SCNC: 31 MMOL/L — SIGNIFICANT CHANGE UP (ref 22–31)
CREAT SERPL-MCNC: 1.05 MG/DL — SIGNIFICANT CHANGE UP (ref 0.5–1.3)
CREAT SERPL-MCNC: 1.05 MG/DL — SIGNIFICANT CHANGE UP (ref 0.5–1.3)
EGFR: 98 ML/MIN/1.73M2 — SIGNIFICANT CHANGE UP
EGFR: 98 ML/MIN/1.73M2 — SIGNIFICANT CHANGE UP
GLUCOSE SERPL-MCNC: 81 MG/DL — SIGNIFICANT CHANGE UP (ref 70–99)
GLUCOSE SERPL-MCNC: 81 MG/DL — SIGNIFICANT CHANGE UP (ref 70–99)
HCT VFR BLD CALC: 46.9 % — SIGNIFICANT CHANGE UP (ref 39–50)
HCT VFR BLD CALC: 46.9 % — SIGNIFICANT CHANGE UP (ref 39–50)
HGB BLD-MCNC: 14 G/DL — SIGNIFICANT CHANGE UP (ref 13–17)
HGB BLD-MCNC: 14 G/DL — SIGNIFICANT CHANGE UP (ref 13–17)
MCHC RBC-ENTMCNC: 25.9 PG — LOW (ref 27–34)
MCHC RBC-ENTMCNC: 25.9 PG — LOW (ref 27–34)
MCHC RBC-ENTMCNC: 29.9 G/DL — LOW (ref 32–36)
MCHC RBC-ENTMCNC: 29.9 G/DL — LOW (ref 32–36)
MCV RBC AUTO: 86.7 FL — SIGNIFICANT CHANGE UP (ref 80–100)
MCV RBC AUTO: 86.7 FL — SIGNIFICANT CHANGE UP (ref 80–100)
NRBC # BLD: 0 /100 WBCS — SIGNIFICANT CHANGE UP (ref 0–0)
NRBC # BLD: 0 /100 WBCS — SIGNIFICANT CHANGE UP (ref 0–0)
PLATELET # BLD AUTO: 286 K/UL — SIGNIFICANT CHANGE UP (ref 150–400)
PLATELET # BLD AUTO: 286 K/UL — SIGNIFICANT CHANGE UP (ref 150–400)
POTASSIUM SERPL-MCNC: 3.5 MMOL/L — SIGNIFICANT CHANGE UP (ref 3.5–5.3)
POTASSIUM SERPL-MCNC: 3.5 MMOL/L — SIGNIFICANT CHANGE UP (ref 3.5–5.3)
POTASSIUM SERPL-SCNC: 3.5 MMOL/L — SIGNIFICANT CHANGE UP (ref 3.5–5.3)
POTASSIUM SERPL-SCNC: 3.5 MMOL/L — SIGNIFICANT CHANGE UP (ref 3.5–5.3)
PROT SERPL-MCNC: 6.9 GM/DL — SIGNIFICANT CHANGE UP (ref 6–8.3)
PROT SERPL-MCNC: 6.9 GM/DL — SIGNIFICANT CHANGE UP (ref 6–8.3)
RBC # BLD: 5.41 M/UL — SIGNIFICANT CHANGE UP (ref 4.2–5.8)
RBC # BLD: 5.41 M/UL — SIGNIFICANT CHANGE UP (ref 4.2–5.8)
RBC # FLD: 16 % — HIGH (ref 10.3–14.5)
RBC # FLD: 16 % — HIGH (ref 10.3–14.5)
SODIUM SERPL-SCNC: 142 MMOL/L — SIGNIFICANT CHANGE UP (ref 135–145)
SODIUM SERPL-SCNC: 142 MMOL/L — SIGNIFICANT CHANGE UP (ref 135–145)
WBC # BLD: 8.46 K/UL — SIGNIFICANT CHANGE UP (ref 3.8–10.5)
WBC # BLD: 8.46 K/UL — SIGNIFICANT CHANGE UP (ref 3.8–10.5)
WBC # FLD AUTO: 8.46 K/UL — SIGNIFICANT CHANGE UP (ref 3.8–10.5)
WBC # FLD AUTO: 8.46 K/UL — SIGNIFICANT CHANGE UP (ref 3.8–10.5)

## 2023-11-02 PROCEDURE — 99232 SBSQ HOSP IP/OBS MODERATE 35: CPT

## 2023-11-02 RX ADMIN — BRIVARACETAM 100 MILLIGRAM(S): 25 TABLET, FILM COATED ORAL at 18:05

## 2023-11-02 RX ADMIN — DIVALPROEX SODIUM 750 MILLIGRAM(S): 500 TABLET, DELAYED RELEASE ORAL at 18:05

## 2023-11-02 RX ADMIN — Medication 500 MILLIGRAM(S): at 05:45

## 2023-11-02 RX ADMIN — OXCARBAZEPINE 300 MILLIGRAM(S): 300 TABLET, FILM COATED ORAL at 18:05

## 2023-11-02 RX ADMIN — Medication 1000 MILLIGRAM(S): at 05:44

## 2023-11-02 RX ADMIN — BRIVARACETAM 100 MILLIGRAM(S): 25 TABLET, FILM COATED ORAL at 05:44

## 2023-11-02 RX ADMIN — Medication 15 MILLIGRAM(S): at 05:44

## 2023-11-02 RX ADMIN — LACOSAMIDE 150 MILLIGRAM(S): 50 TABLET ORAL at 05:43

## 2023-11-02 RX ADMIN — LACOSAMIDE 150 MILLIGRAM(S): 50 TABLET ORAL at 18:04

## 2023-11-02 RX ADMIN — Medication 2 MILLIGRAM(S): at 12:07

## 2023-11-02 RX ADMIN — HALOPERIDOL DECANOATE 2 MILLIGRAM(S): 100 INJECTION INTRAMUSCULAR at 20:24

## 2023-11-02 RX ADMIN — PANTOPRAZOLE SODIUM 40 MILLIGRAM(S): 20 TABLET, DELAYED RELEASE ORAL at 05:46

## 2023-11-02 RX ADMIN — ENOXAPARIN SODIUM 40 MILLIGRAM(S): 100 INJECTION SUBCUTANEOUS at 05:44

## 2023-11-02 RX ADMIN — DIVALPROEX SODIUM 750 MILLIGRAM(S): 500 TABLET, DELAYED RELEASE ORAL at 05:44

## 2023-11-02 RX ADMIN — OXCARBAZEPINE 300 MILLIGRAM(S): 300 TABLET, FILM COATED ORAL at 05:45

## 2023-11-02 NOTE — PROGRESS NOTE ADULT - SUBJECTIVE AND OBJECTIVE BOX
INTERVAL HPI/OVERNIGHT EVENTS:  Pt seen and examined at bedside.     Allergies/Intolerance: No Known Allergies      MEDICATIONS  (STANDING):  amoxicillin 1000 milliGRAM(s) Oral two times a day  brivaracetam 100 milliGRAM(s) Oral two times a day  clarithromycin 500 milliGRAM(s) Oral two times a day  cyanocobalamin 1000 MICROGram(s) Oral daily  divalproex  milliGRAM(s) Oral two times a day  enoxaparin Injectable 40 milliGRAM(s) SubCutaneous every 24 hours  lacosamide 150 milliGRAM(s) Oral two times a day  OXcarbazepine 300 milliGRAM(s) Oral two times a day  pantoprazole    Tablet 40 milliGRAM(s) Oral before breakfast  predniSONE   Tablet 15 milliGRAM(s) Oral daily  sodium chloride 0.9%. 1000 milliLiter(s) (125 mL/Hr) IV Continuous <Continuous>  trimethoprim  160 mG/sulfamethoxazole 800 mG 1 Tablet(s) Oral <User Schedule>    MEDICATIONS  (PRN):  acetaminophen     Tablet .. 650 milliGRAM(s) Oral every 6 hours PRN Temp greater or equal to 38C (100.4F), Mild Pain (1 - 3)  aluminum hydroxide/magnesium hydroxide/simethicone Suspension 30 milliLiter(s) Oral every 4 hours PRN Dyspepsia  haloperidol    Injectable 2 milliGRAM(s) IntraMuscular every 6 hours PRN Agitation  LORazepam   Injectable 2 milliGRAM(s) IntraMuscular every 2 hours PRN seizure  melatonin 3 milliGRAM(s) Oral at bedtime PRN Insomnia  ondansetron Injectable 4 milliGRAM(s) IV Push every 8 hours PRN Nausea and/or Vomiting        ROS: all systems reviewed and wnl      PHYSICAL EXAMINATION:  Vital Signs Last 24 Hrs  T(C): 36.7 (02 Nov 2023 04:30), Max: 37.1 (01 Nov 2023 17:59)  T(F): 98 (02 Nov 2023 04:30), Max: 98.8 (01 Nov 2023 17:59)  HR: 64 (02 Nov 2023 04:30) (64 - 91)  BP: 127/84 (02 Nov 2023 04:30) (113/78 - 127/84)  BP(mean): --  RR: 18 (02 Nov 2023 04:30) (17 - 18)  SpO2: 96% (02 Nov 2023 04:30) (95% - 98%)    Parameters below as of 02 Nov 2023 04:30  Patient On (Oxygen Delivery Method): room air      CAPILLARY BLOOD GLUCOSE          11-01 @ 07:01  -  11-02 @ 07:00  --------------------------------------------------------  IN: 620 mL / OUT: 600 mL / NET: 20 mL        GENERAL: stable, no seizures or fevers.   NECK: supple, No JVD  CHEST/LUNG: clear to auscultation bilaterally; no rales, rhonchi, or wheezing b/l  HEART: normal S1, S2  ABDOMEN: BS+, soft, ND, NT   EXTREMITIES:  pulses palpable; no clubbing, cyanosis, or edema b/l LEs    LABS:                        14.0   8.46  )-----------( 286      ( 02 Nov 2023 06:30 )             46.9     11-02    142  |  106  |  11  ----------------------------<  81  3.5   |  31  |  1.05    Ca    9.1      02 Nov 2023 06:30  Mg     2.2     11-01    TPro  6.9  /  Alb  3.4  /  TBili  0.4  /  DBili  x   /  AST  68<H>  /  ALT  94<H>  /  AlkPhos  56  11-02      Urinalysis Basic - ( 02 Nov 2023 06:30 )    Color: x / Appearance: x / SG: x / pH: x  Gluc: 81 mg/dL / Ketone: x  / Bili: x / Urobili: x   Blood: x / Protein: x / Nitrite: x   Leuk Esterase: x / RBC: x / WBC x   Sq Epi: x / Non Sq Epi: x / Bacteria: x

## 2023-11-02 NOTE — PROGRESS NOTE ADULT - ASSESSMENT
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     Currently post-ictal and agitated.     Refused EEG x 2 attempts.     Brain MRI pending.     Continue all present seizure meds.     To be transfered to [facility] next few days.

## 2023-11-02 NOTE — CHART NOTE - NSCHARTNOTEFT_GEN_A_CORE
Spoke w/ patient's primary neurologist in [place], Dr Dooley (cell [phone]). Patient is cognitively impaired at baseline.    Patient has h/o autoimmune encephalitis and is on Prednisone 15mg daily now. He was on high dose steroids before but has since been tapered down and no longer required Bactrim for PCP prophylaxis.   The Augmentin and the Clarithromycin are likely treatment for Hpylori.

## 2023-11-02 NOTE — PROGRESS NOTE ADULT - SUBJECTIVE AND OBJECTIVE BOX
Neurology Progress Note    No acute events overnight. No seizures.    Neuro Exam: AOx2. Perseverates. Follows commands. No dysarthria. No facial droop. PERRL. Moving all four extremities.     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

## 2023-11-03 LAB
OXCARBAZEPINE SERPL-MCNC: 9 UG/ML — LOW (ref 10–35)
OXCARBAZEPINE SERPL-MCNC: 9 UG/ML — LOW (ref 10–35)

## 2023-11-03 PROCEDURE — 99232 SBSQ HOSP IP/OBS MODERATE 35: CPT

## 2023-11-03 RX ADMIN — LACOSAMIDE 150 MILLIGRAM(S): 50 TABLET ORAL at 17:22

## 2023-11-03 RX ADMIN — HALOPERIDOL DECANOATE 2 MILLIGRAM(S): 100 INJECTION INTRAMUSCULAR at 11:17

## 2023-11-03 RX ADMIN — Medication 200 MILLIGRAM(S): at 21:50

## 2023-11-03 RX ADMIN — BRIVARACETAM 100 MILLIGRAM(S): 25 TABLET, FILM COATED ORAL at 17:21

## 2023-11-03 RX ADMIN — LACOSAMIDE 150 MILLIGRAM(S): 50 TABLET ORAL at 06:06

## 2023-11-03 RX ADMIN — PREGABALIN 1000 MICROGRAM(S): 225 CAPSULE ORAL at 11:37

## 2023-11-03 RX ADMIN — OXCARBAZEPINE 300 MILLIGRAM(S): 300 TABLET, FILM COATED ORAL at 06:00

## 2023-11-03 RX ADMIN — Medication 15 MILLIGRAM(S): at 06:00

## 2023-11-03 RX ADMIN — DIVALPROEX SODIUM 750 MILLIGRAM(S): 500 TABLET, DELAYED RELEASE ORAL at 18:57

## 2023-11-03 RX ADMIN — PANTOPRAZOLE SODIUM 40 MILLIGRAM(S): 20 TABLET, DELAYED RELEASE ORAL at 06:06

## 2023-11-03 RX ADMIN — Medication 2 MILLIGRAM(S): at 17:24

## 2023-11-03 RX ADMIN — OXCARBAZEPINE 300 MILLIGRAM(S): 300 TABLET, FILM COATED ORAL at 17:22

## 2023-11-03 RX ADMIN — BRIVARACETAM 100 MILLIGRAM(S): 25 TABLET, FILM COATED ORAL at 06:05

## 2023-11-03 RX ADMIN — HALOPERIDOL DECANOATE 2 MILLIGRAM(S): 100 INJECTION INTRAMUSCULAR at 17:23

## 2023-11-03 RX ADMIN — ENOXAPARIN SODIUM 40 MILLIGRAM(S): 100 INJECTION SUBCUTANEOUS at 05:59

## 2023-11-03 RX ADMIN — DIVALPROEX SODIUM 750 MILLIGRAM(S): 500 TABLET, DELAYED RELEASE ORAL at 05:59

## 2023-11-03 NOTE — PROGRESS NOTE ADULT - SUBJECTIVE AND OBJECTIVE BOX
Neurology Progress Note    No acute events overnight. No seizures.    Neuro Exam: AOx2. Perseverates. Follows commands. No dysarthria. No facial droop. PERRL. Moving all four extremities.     MRI brain- refused  EEG- refused    A/P:   Status epilepticus- resolved  Epilepsy  Hx of Autoimmune encephalitis  Mild cognitive impairment    - MRI brain wwo gwendolyn and EEG refused  - Continue Briviact 100mg BID, Depakote 750mg BID, Vimpat 150mg BID, and Trileptal 300mg BID  - Neuro exam improved and probably at baseline.  - patient is pending transfer to [facility] in [facility]  - will sign off. Please re-consult if needed.

## 2023-11-03 NOTE — PROGRESS NOTE ADULT - ASSESSMENT
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     Currently post-ictal and agitated.     Refused EEG x 2 attempts.     Brain MRI pending.     Continue all present seizure meds.     To be transfered to [facility] next few days.                            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       Currently post-ictal and agitated.     Refused EEG x 2 attempts.     Brain MRI refused. .     Continue all present seizure meds.     To be transfered to [facility] next few days.       Spoke to [facility], Neurology Dr. Alexy Orosco will accept patiet to inpatient     Neurology service. They will arrange for transfer, spoke to social work today.                          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       Currently post-ictal and agitated.     Refused EEG x 2 attempts.     Brain MRI refused. .     Continue all present seizure meds.       To be transfered to [facility]"" next few days.       Spoke to [facility], Neurology Dr. Alexy Orosco will accept patiet to inpatient     Neurology service. They will arrange for transfer, spoke to social work today on 2 C.

## 2023-11-03 NOTE — PROGRESS NOTE ADULT - SUBJECTIVE AND OBJECTIVE BOX
INTERVAL HPI/OVERNIGHT EVENTS:  Pt seen and examined at bedside.     Allergies/Intolerance: No Known Allergies      MEDICATIONS  (STANDING):  brivaracetam 100 milliGRAM(s) Oral two times a day  cyanocobalamin 1000 MICROGram(s) Oral daily  divalproex  milliGRAM(s) Oral two times a day  enoxaparin Injectable 40 milliGRAM(s) SubCutaneous every 24 hours  lacosamide 150 milliGRAM(s) Oral two times a day  OXcarbazepine 300 milliGRAM(s) Oral two times a day  pantoprazole    Tablet 40 milliGRAM(s) Oral before breakfast  predniSONE   Tablet 15 milliGRAM(s) Oral daily    MEDICATIONS  (PRN):  acetaminophen     Tablet .. 650 milliGRAM(s) Oral every 6 hours PRN Temp greater or equal to 38C (100.4F), Mild Pain (1 - 3)  haloperidol    Injectable 2 milliGRAM(s) IntraMuscular every 6 hours PRN Agitation  LORazepam   Injectable 2 milliGRAM(s) IntraMuscular every 2 hours PRN seizure  melatonin 3 milliGRAM(s) Oral at bedtime PRN Insomnia        ROS: all systems reviewed and wnl      PHYSICAL EXAMINATION:  Vital Signs Last 24 Hrs  T(C): 37.1 (03 Nov 2023 04:34), Max: 37.1 (03 Nov 2023 04:34)  T(F): 98.7 (03 Nov 2023 04:34), Max: 98.7 (03 Nov 2023 04:34)  HR: 67 (03 Nov 2023 04:34) (63 - 80)  BP: 114/76 (03 Nov 2023 04:34) (108/67 - 114/76)  BP(mean): --  RR: 18 (03 Nov 2023 04:34) (18 - 18)  SpO2: 99% (03 Nov 2023 04:34) (94% - 99%)    Parameters below as of 02 Nov 2023 15:53  Patient On (Oxygen Delivery Method): room air      CAPILLARY BLOOD GLUCOSE          11-02 @ 07:01  -  11-03 @ 07:00  --------------------------------------------------------  IN: 0 mL / OUT: 900 mL / NET: -900 mL        GENERAL: stable, sits in chair, on 1:1  NECK: supple, No JVD  CHEST/LUNG: clear to auscultation bilaterally; no rales, rhonchi, or wheezing b/l  HEART: normal S1, S2  ABDOMEN: BS+, soft, ND, NT   EXTREMITIES:  pulses palpable; no clubbing, cyanosis, or edema b/l LEs  SKIN: no rashes or lesions      LABS:                        14.0   8.46  )-----------( 286      ( 02 Nov 2023 06:30 )             46.9     11-02    142  |  106  |  11  ----------------------------<  81  3.5   |  31  |  1.05    Ca    9.1      02 Nov 2023 06:30  Mg     2.2     11-01    TPro  6.9  /  Alb  3.4  /  TBili  0.4  /  DBili  x   /  AST  68<H>  /  ALT  94<H>  /  AlkPhos  56  11-02      Urinalysis Basic - ( 02 Nov 2023 06:30 )    Color: x / Appearance: x / SG: x / pH: x  Gluc: 81 mg/dL / Ketone: x  / Bili: x / Urobili: x   Blood: x / Protein: x / Nitrite: x   Leuk Esterase: x / RBC: x / WBC x   Sq Epi: x / Non Sq Epi: x / Bacteria: x             INTERVAL HPI/OVERNIGHT EVENTS:  Pt seen and examined at bedside.     Allergies/Intolerance: No Known Allergies      MEDICATIONS  (STANDING):  brivaracetam 100 milliGRAM(s) Oral two times a day  cyanocobalamin 1000 MICROGram(s) Oral daily  divalproex  milliGRAM(s) Oral two times a day  enoxaparin Injectable 40 milliGRAM(s) SubCutaneous every 24 hours  lacosamide 150 milliGRAM(s) Oral two times a day  OXcarbazepine 300 milliGRAM(s) Oral two times a day  pantoprazole    Tablet 40 milliGRAM(s) Oral before breakfast  predniSONE   Tablet 15 milliGRAM(s) Oral daily    MEDICATIONS  (PRN):  acetaminophen     Tablet .. 650 milliGRAM(s) Oral every 6 hours PRN Temp greater or equal to 38C (100.4F), Mild Pain (1 - 3)  haloperidol    Injectable 2 milliGRAM(s) IntraMuscular every 6 hours PRN Agitation  LORazepam   Injectable 2 milliGRAM(s) IntraMuscular every 2 hours PRN seizure  melatonin 3 milliGRAM(s) Oral at bedtime PRN Insomnia        ROS: all systems reviewed and wnl      PHYSICAL EXAMINATION:  Vital Signs Last 24 Hrs  T(C): 37.1 (03 Nov 2023 04:34), Max: 37.1 (03 Nov 2023 04:34)  T(F): 98.7 (03 Nov 2023 04:34), Max: 98.7 (03 Nov 2023 04:34)  HR: 67 (03 Nov 2023 04:34) (63 - 80)  BP: 114/76 (03 Nov 2023 04:34) (108/67 - 114/76)  BP(mean): --  RR: 18 (03 Nov 2023 04:34) (18 - 18)  SpO2: 99% (03 Nov 2023 04:34) (94% - 99%)    Parameters below as of 02 Nov 2023 15:53  Patient On (Oxygen Delivery Method): room air      CAPILLARY BLOOD GLUCOSE          11-02 @ 07:01  -  11-03 @ 07:00  --------------------------------------------------------  IN: 0 mL / OUT: 900 mL / NET: -900 mL        GENERAL: stable, sits in chair, on 1:1, more alert today.   NECK: supple, No JVD  CHEST/LUNG: clear to auscultation bilaterally; no rales, rhonchi, or wheezing b/l  HEART: normal S1, S2  ABDOMEN: BS+, soft, ND, NT   EXTREMITIES:  pulses palpable; no clubbing, cyanosis, or edema b/l LEs  SKIN: no rashes or lesions      LABS:                        14.0   8.46  )-----------( 286      ( 02 Nov 2023 06:30 )             46.9     11-02    142  |  106  |  11  ----------------------------<  81  3.5   |  31  |  1.05    Ca    9.1      02 Nov 2023 06:30  Mg     2.2     11-01    TPro  6.9  /  Alb  3.4  /  TBili  0.4  /  DBili  x   /  AST  68<H>  /  ALT  94<H>  /  AlkPhos  56  11-02      Urinalysis Basic - ( 02 Nov 2023 06:30 )    Color: x / Appearance: x / SG: x / pH: x  Gluc: 81 mg/dL / Ketone: x  / Bili: x / Urobili: x   Blood: x / Protein: x / Nitrite: x   Leuk Esterase: x / RBC: x / WBC x   Sq Epi: x / Non Sq Epi: x / Bacteria: x

## 2023-11-03 NOTE — CHART NOTE - NSCHARTNOTEFT_GEN_A_CORE
Patient seen for MST score 2 or > nutrition risk rescreening. Patient has been screened for and presents negative for    -Pressure ulcers stage 2 or greater  -Enteral or Parenteral Nutrition  -BMI <18  -[id number] >8  -Chewing/Swallowing Difficulty  -Decreased appetite  -Unintentional Weight Loss  -Inadequate diet (i.e. NPO/Clear Liquids)    No intervention required at this time. RD remains available. Pt is Consult received for "MST Score = />2." Upon chart review, patient with stable weight, does not currently meet criteria for Protein Calorie Malnutrition risk per MST. RD remains available for assessment per protocol or as needed. Seen on medical floor, adm w/ breakthrough seizure. Currently post - ictal ; Alert / confused ; AMS. Pt to be transferred to [facility] in the next few days. No reports of N/V/D/C/Chewing/Swallowing issues, No food allergies. Pt w/ difficulty understanding & speaking ( not related to language barrier). Tolerating diet well. Consuming 75% of meals. Patient seen for MST score 2 or > nutrition risk rescreening. Patient has been screened for and presents negative for    -Pressure ulcers stage 2 or greater  -Enteral or Parenteral Nutrition  -BMI <18  -FcxhtsbovuZ4E >8  -Chewing/Swallowing Difficulty  -Decreased appetite  -Unintentional Weight Loss  -Inadequate diet (i.e. NPO/Clear Liquids)    No intervention required at this time. RD remains available. Pt is Consult received for "MST Score = />2." Upon chart review, patient with stable weight, does not currently meet criteria for Protein Calorie Malnutrition risk per MST. RD remains available for assessment per protocol or as needed. Seen on medical floor, adm w/ breakthrough seizure. Currently post - ictal ; Alert / confused ; AMS. Pt to be transferred to [facility] in the next few days. No reports of N/V/D/C/Chewing/Swallowing issues, No food allergies. Pt w/ difficulty understanding & speaking ( not related to language barrier). Tolerating diet well. Consuming 75% of meals. Pt appears WDWN.

## 2023-11-04 PROCEDURE — 99233 SBSQ HOSP IP/OBS HIGH 50: CPT

## 2023-11-04 RX ORDER — QUETIAPINE FUMARATE 200 MG/1
100 TABLET, FILM COATED ORAL AT BEDTIME
Refills: 0 | Status: DISCONTINUED | OUTPATIENT
Start: 2023-11-04 | End: 2023-11-10

## 2023-11-04 RX ORDER — HALOPERIDOL DECANOATE 100 MG/ML
5 INJECTION INTRAMUSCULAR ONCE
Refills: 0 | Status: DISCONTINUED | OUTPATIENT
Start: 2023-11-04 | End: 2023-11-04

## 2023-11-04 RX ORDER — OXCARBAZEPINE 300 MG/1
1 TABLET, FILM COATED ORAL
Refills: 0 | DISCHARGE

## 2023-11-04 RX ORDER — DIPHENHYDRAMINE HCL 50 MG
50 CAPSULE ORAL ONCE
Refills: 0 | Status: COMPLETED | OUTPATIENT
Start: 2023-11-04 | End: 2023-11-04

## 2023-11-04 RX ORDER — HALOPERIDOL DECANOATE 100 MG/ML
5 INJECTION INTRAMUSCULAR EVERY 6 HOURS
Refills: 0 | Status: DISCONTINUED | OUTPATIENT
Start: 2023-11-04 | End: 2023-11-10

## 2023-11-04 RX ORDER — LACOSAMIDE 50 MG/1
1 TABLET ORAL
Refills: 0 | DISCHARGE

## 2023-11-04 RX ORDER — PANTOPRAZOLE SODIUM 20 MG/1
1 TABLET, DELAYED RELEASE ORAL
Refills: 0 | DISCHARGE

## 2023-11-04 RX ORDER — DIVALPROEX SODIUM 500 MG/1
3 TABLET, DELAYED RELEASE ORAL
Refills: 0 | DISCHARGE

## 2023-11-04 RX ORDER — HALOPERIDOL DECANOATE 100 MG/ML
5 INJECTION INTRAMUSCULAR ONCE
Refills: 0 | Status: COMPLETED | OUTPATIENT
Start: 2023-11-04 | End: 2023-11-04

## 2023-11-04 RX ADMIN — OXCARBAZEPINE 300 MILLIGRAM(S): 300 TABLET, FILM COATED ORAL at 05:58

## 2023-11-04 RX ADMIN — PANTOPRAZOLE SODIUM 40 MILLIGRAM(S): 20 TABLET, DELAYED RELEASE ORAL at 05:58

## 2023-11-04 RX ADMIN — Medication 200 MILLIGRAM(S): at 14:02

## 2023-11-04 RX ADMIN — Medication 50 MILLIGRAM(S): at 16:41

## 2023-11-04 RX ADMIN — BRIVARACETAM 100 MILLIGRAM(S): 25 TABLET, FILM COATED ORAL at 05:57

## 2023-11-04 RX ADMIN — Medication 2 MILLIGRAM(S): at 12:00

## 2023-11-04 RX ADMIN — LACOSAMIDE 150 MILLIGRAM(S): 50 TABLET ORAL at 05:57

## 2023-11-04 RX ADMIN — Medication 2 MILLIGRAM(S): at 16:25

## 2023-11-04 RX ADMIN — DIVALPROEX SODIUM 750 MILLIGRAM(S): 500 TABLET, DELAYED RELEASE ORAL at 05:58

## 2023-11-04 RX ADMIN — LACOSAMIDE 150 MILLIGRAM(S): 50 TABLET ORAL at 17:47

## 2023-11-04 RX ADMIN — OXCARBAZEPINE 300 MILLIGRAM(S): 300 TABLET, FILM COATED ORAL at 17:47

## 2023-11-04 RX ADMIN — HALOPERIDOL DECANOATE 5 MILLIGRAM(S): 100 INJECTION INTRAMUSCULAR at 16:25

## 2023-11-04 RX ADMIN — Medication 200 MILLIGRAM(S): at 22:04

## 2023-11-04 RX ADMIN — DIVALPROEX SODIUM 750 MILLIGRAM(S): 500 TABLET, DELAYED RELEASE ORAL at 17:47

## 2023-11-04 RX ADMIN — QUETIAPINE FUMARATE 100 MILLIGRAM(S): 200 TABLET, FILM COATED ORAL at 22:04

## 2023-11-04 RX ADMIN — Medication 15 MILLIGRAM(S): at 05:58

## 2023-11-04 RX ADMIN — BRIVARACETAM 100 MILLIGRAM(S): 25 TABLET, FILM COATED ORAL at 17:47

## 2023-11-04 RX ADMIN — Medication 200 MILLIGRAM(S): at 05:57

## 2023-11-04 NOTE — PROGRESS NOTE ADULT - SUBJECTIVE AND OBJECTIVE BOX
Patient is a 28y old  Male who presents with a chief complaint of Breakthrough seizure. (03 Nov 2023 16:19)      SUBJECTIVE / OVERNIGHT EVENTS: Patient seen and examined at bedside. No acute events overnight. Periodically agitated, but calm this morning.    MEDICATIONS  (STANDING):  benzonatate 200 milliGRAM(s) Oral three times a day  brivaracetam 100 milliGRAM(s) Oral two times a day  cyanocobalamin 1000 MICROGram(s) Oral <User Schedule>  divalproex  milliGRAM(s) Oral two times a day  haloperidol    Injectable 5 milliGRAM(s) IntraMuscular once  lacosamide 150 milliGRAM(s) Oral two times a day  OXcarbazepine 300 milliGRAM(s) Oral two times a day  pantoprazole    Tablet 40 milliGRAM(s) Oral before breakfast  predniSONE   Tablet 15 milliGRAM(s) Oral daily    MEDICATIONS  (PRN):  haloperidol    Injectable 5 milliGRAM(s) IntraMuscular every 6 hours PRN Severe Agitation  LORazepam   Injectable 2 milliGRAM(s) IV Push every 4 hours PRN Agitation  LORazepam   Injectable 2 milliGRAM(s) IntraMuscular every 2 hours PRN Seizure like activity  melatonin 3 milliGRAM(s) Oral at bedtime PRN Insomnia      CAPILLARY BLOOD GLUCOSE        I&O's Summary    04 Nov 2023 08:01  -  04 Nov 2023 16:24  --------------------------------------------------------  IN: 720 mL / OUT: 0 mL / NET: 720 mL        PHYSICAL EXAM:  Vital Signs Last 24 Hrs  T(C): 36.9 (04 Nov 2023 10:35), Max: 37 (03 Nov 2023 16:54)  T(F): 98.4 (04 Nov 2023 10:35), Max: 98.6 (03 Nov 2023 16:54)  HR: 80 (04 Nov 2023 10:35) (68 - 93)  BP: 102/66 (04 Nov 2023 10:35) (102/66 - 158/83)  BP(mean): --  RR: 18 (04 Nov 2023 10:35) (18 - 18)  SpO2: 94% (04 Nov 2023 10:35) (94% - 98%)        GEN: male in NAD, appears comfortable, no diaphoresis  EYES: No scleral injection, EOMI  ENTM: neck supple & symmetric without tracheal deviation, moist membranes, no gross hearing impairment, thyroid gland not enlarged  CV: +S1/S2, no m/r/g, no abdominal bruit, no LE edema  RESP: breathing comfortably, no respiratory accessory muscle use, CTAB, no w/r/r  GI: normoactive BS, soft, NTND, no rebounding/guarding, no palpable masses    LABS:                      RADIOLOGY & ADDITIONAL TESTS:  Results Reviewed:   Imaging Personally Reviewed:  Electrocardiogram Personally Reviewed:    COORDINATION OF CARE:  Care Discussed with Consultants/Other Providers [Y/N]:  Prior or Outpatient Records Reviewed [Y/N]:

## 2023-11-04 NOTE — CHART NOTE - NSCHARTNOTEFT_GEN_A_CORE
Patient is a 28y old  Male who presents with a chief complaint of Breakthrough seizure. (04 Nov 2023 16:24)    Called by RN to evaluate pt. with c/o   Vital Signs Last 24 Hrs  T(C): 36.9 (04 Nov 2023 17:00), Max: 37 (03 Nov 2023 23:21)  T(F): 98.5 (04 Nov 2023 17:00), Max: 98.6 (03 Nov 2023 23:21)  HR: 69 (04 Nov 2023 17:00) (69 - 93)  BP: 113/74 (04 Nov 2023 17:00) (102/66 - 158/83)  BP(mean): --  RR: 16 (04 Nov 2023 17:00) (16 - 18)  SpO2: 94% (04 Nov 2023 17:00) (94% - 98%)    Parameters below as of 04 Nov 2023 17:00  Patient On (Oxygen Delivery Method): room air                                              CAPILLARY BLOOD GLUCOSE        benzonatate 200 milliGRAM(s) Oral three times a day  brivaracetam 100 milliGRAM(s) Oral two times a day  cyanocobalamin 1000 MICROGram(s) Oral <User Schedule>  divalproex  milliGRAM(s) Oral two times a day  haloperidol    Injectable 5 milliGRAM(s) IntraMuscular every 6 hours PRN  lacosamide 150 milliGRAM(s) Oral two times a day  LORazepam   Injectable 2 milliGRAM(s) IV Push every 4 hours PRN  LORazepam   Injectable 2 milliGRAM(s) IntraMuscular every 2 hours PRN  melatonin 3 milliGRAM(s) Oral at bedtime PRN  OXcarbazepine 300 milliGRAM(s) Oral two times a day  pantoprazole    Tablet 40 milliGRAM(s) Oral before breakfast  predniSONE   Tablet 15 milliGRAM(s) Oral daily  QUEtiapine 100 milliGRAM(s) Oral at bedtime      REVIEW OF SYSTEMS:    RESPIRATORY: No cough, wheezing, chills or hemoptysis; No shortness of breath  CARDIOVASCULAR: No chest pain, palpitations, dizziness, or leg swelling  GASTROINTESTINAL: No abdominal or epigastric pain. No nausea, vomiting, or hematemesis; No diarrhea or constipation. No melena or hematochezia.  GENITOURINARY: No dysuria, frequency, hematuria, or incontinence  NEUROLOGICAL: No headaches, memory loss, loss of strength, numbness, or tremors      PHYSICAL EXAM:    GENERAL: NAD, well-groomed, well-developed  NERVOUS SYSTEM:  Alert & Oriented X3, Good concentration; Motor Strength 5/5 B/L upper and lower extremities; DTRs 2+ intact and symmetric  CHEST/LUNG: Clear to percussion bilaterally; No rales, rhonchi, wheezing, or rubs  HEART: Regular rate and rhythm; No murmurs, rubs, or gallops  ABDOMEN: Soft, Nontender, Nondistended; Bowel sounds present  EXTREMITIES:  2+ Peripheral Pulses, No clubbing, cyanosis, or edema    Assessment: Patient 28y with SEIZURES    SEIZURE    Seizure    H/O encephalopathy    History of tonsillectomy        Plan:  - Continue current treatment  - Follow up labs  - D/w                       aware and agree with the plan  - Will continue to follow up Patient is a 28y old  Male who presents with a chief complaint of Breakthrough seizure. (04 Nov 2023 16:24)    Called by RN to evaluate pt. 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 Patient's aunt sitting at bedside. Patient is still in some post-ictal state, but he is able to answer some of the simple questions. Reportedly, he had tonic-clonic seizures x5 at home followed by post ictal state. No reported tongue bite or bowel/bladder incontinence, no head strike. His seizure had last for about 19 minutes. Had received versed 5mg IM in route by EMS. He denies headache, vomiting, neck pain or stiffness. Aunt says that his Brivaracetam has been recently changed with Oxcarbazepine 300 mg bid. He has been diagnosed with encephalitis and he is antibiotics and prednisone per his aunt. Patient is compliant on his all medications. Otherwise, he denies fever, chills, chest pain, sob, N/V, abdominal pain, diarrhea or dysuria.  He does vape but denies alcohol or substance abuse.  Sig labs: WBC 12.76k, N 78%, BUN 12, Cr 1.35, ALT 87, Valproic level 85 (normal range ) and Oxcarbazepine level pending.  CT head negative.    Attempted to reorient patient, patient continued to be aggressive code gray called . Pt. attempted to attack staff member with WOW.   ICU consulted  Benadryl, Haldol, ativan given IM   Midline placed by ICU attending          Vital Signs Last 24 Hrs  T(C): 36.9 (04 Nov 2023 17:00), Max: 37 (03 Nov 2023 23:21)  T(F): 98.5 (04 Nov 2023 17:00), Max: 98.6 (03 Nov 2023 23:21)  HR: 69 (04 Nov 2023 17:00) (69 - 93)  BP: 113/74 (04 Nov 2023 17:00) (102/66 - 158/83)  BP(mean): --  RR: 16 (04 Nov 2023 17:00) (16 - 18)  SpO2: 94% (04 Nov 2023 17:00) (94% - 98%)    Parameters below as of 04 Nov 2023 17:00  Patient On (Oxygen Delivery Method): room air                            CAPILLARY BLOOD GLUCOSE    benzonatate 200 milliGRAM(s) Oral three times a day  brivaracetam 100 milliGRAM(s) Oral two times a day  cyanocobalamin 1000 MICROGram(s) Oral <User Schedule>  divalproex  milliGRAM(s) Oral two times a day  haloperidol    Injectable 5 milliGRAM(s) IntraMuscular every 6 hours PRN  lacosamide 150 milliGRAM(s) Oral two times a day  LORazepam   Injectable 2 milliGRAM(s) IV Push every 4 hours PRN  LORazepam   Injectable 2 milliGRAM(s) IntraMuscular every 2 hours PRN  melatonin 3 milliGRAM(s) Oral at bedtime PRN  OXcarbazepine 300 milliGRAM(s) Oral two times a day  pantoprazole    Tablet 40 milliGRAM(s) Oral before breakfast  predniSONE   Tablet 15 milliGRAM(s) Oral daily  QUEtiapine 100 milliGRAM(s) Oral at bedtime      Assessment: Patient 28y with SEIZURES  28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 Currently post-ictal and agitated and pending transfer to [facility] in [place], [place].      Plan:  - Continue current treatment   - Continue 1:1 observation  -Follow-up attending Patient is a 28y old  Male who presents with a chief complaint of Breakthrough seizure. (04 Nov 2023 16:24)    Called by RN to evaluate pt. 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 Patient's aunt sitting at bedside. Patient is still in some post-ictal state, but he is able to answer some of the simple questions. Reportedly, he had tonic-clonic seizures x5 at home followed by post ictal state. No reported tongue bite or bowel/bladder incontinence, no head strike. His seizure had last for about 19 minutes. Had received versed 5mg IM in route by EMS. He denies headache, vomiting, neck pain or stiffness. Aunt says that his Brivaracetam has been recently changed with Oxcarbazepine 300 mg bid. He has been diagnosed with encephalitis and he is antibiotics and prednisone per his aunt. Patient is compliant on his all medications. Otherwise, he denies fever, chills, chest pain, sob, N/V, abdominal pain, diarrhea or dysuria.  He does vape but denies alcohol or substance abuse.  Sig labs: WBC 12.76k, N 78%, BUN 12, Cr 1.35, ALT 87, Valproic level 85 (normal range ) and Oxcarbazepine level pending.  CT head negative.    Attempted to reorient patient, patient continued to be aggressive code gray called, code green called. Pt. attempted to attack staff member with WOW.   ICU consulted  Benadryl, Haldol, ativan given IM   Midline placed by ICU attending          Vital Signs Last 24 Hrs  T(C): 36.9 (04 Nov 2023 17:00), Max: 37 (03 Nov 2023 23:21)  T(F): 98.5 (04 Nov 2023 17:00), Max: 98.6 (03 Nov 2023 23:21)  HR: 69 (04 Nov 2023 17:00) (69 - 93)  BP: 113/74 (04 Nov 2023 17:00) (102/66 - 158/83)  BP(mean): --  RR: 16 (04 Nov 2023 17:00) (16 - 18)  SpO2: 94% (04 Nov 2023 17:00) (94% - 98%)    Parameters below as of 04 Nov 2023 17:00  Patient On (Oxygen Delivery Method): room air                            CAPILLARY BLOOD GLUCOSE    benzonatate 200 milliGRAM(s) Oral three times a day  brivaracetam 100 milliGRAM(s) Oral two times a day  cyanocobalamin 1000 MICROGram(s) Oral <User Schedule>  divalproex  milliGRAM(s) Oral two times a day  haloperidol    Injectable 5 milliGRAM(s) IntraMuscular every 6 hours PRN  lacosamide 150 milliGRAM(s) Oral two times a day  LORazepam   Injectable 2 milliGRAM(s) IV Push every 4 hours PRN  LORazepam   Injectable 2 milliGRAM(s) IntraMuscular every 2 hours PRN  melatonin 3 milliGRAM(s) Oral at bedtime PRN  OXcarbazepine 300 milliGRAM(s) Oral two times a day  pantoprazole    Tablet 40 milliGRAM(s) Oral before breakfast  predniSONE   Tablet 15 milliGRAM(s) Oral daily  QUEtiapine 100 milliGRAM(s) Oral at bedtime      Assessment: Patient 28y with SEIZURES  28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 Currently post-ictal and agitated and pending transfer to [facility] in [place], [place].      Plan:  - Continue current treatment   - Continue 1:1 observation  -Follow-up attending

## 2023-11-04 NOTE — PROVIDER CONTACT NOTE (CHANGE IN STATUS NOTIFICATION) - BACKGROUND
Pt admitted for seizures and altered mental status
History of stroke. Altered mental status.
Pt admitted for seizures, AMS

## 2023-11-04 NOTE — PROGRESS NOTE ADULT - ASSESSMENT
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 Currently post-ictal and agitated and pending transfer to [facility] in [place], [place].

## 2023-11-04 NOTE — PROVIDER CONTACT NOTE (CHANGE IN STATUS NOTIFICATION) - ASSESSMENT
Patient being inappropriate with staff, attempting to touch hair. Pt wandering the halls in underpants and refusing to go back to room.
Patient is aggressive and actively trying to harm staff by using code cart.
Pt wandering the halls and attempted to enter pantry, he was asked to go back to his room and became very agitated and aggressive; Pt punched the wall and went back to room and slammed door shut, not allowing staff into the room. Pt was refusing to open the door.  Keven tenorio was called at 14:33

## 2023-11-04 NOTE — PROVIDER CONTACT NOTE (CHANGE IN STATUS NOTIFICATION) - SITUATION
Keven Stanley called for agitation and aggresion
Patient agitated and uncooperative. Security called for inappropriate behaviors.
Patient agitated and aggressive. Multiple Code greys called. Escalated to code green because patient was trying to use nurses medication card to hurt NP Gracie.

## 2023-11-04 NOTE — CHART NOTE - NSCHARTNOTEFT_GEN_A_CORE
Called to anna Elder.  Pt using work station to attack staff, at my visit back in bed, with concerning affect, he is disoriented but no longer violent.  Eating Mayonnaise packets with the foil container.    Given Bendaryl 50, ativan and Haldol 5.   IM   Allowed us to place Midline for access.  Back to resting now. Called to anna Elder.  Pt using work station to attack staff, at my visit back in bed, with concerning affect, he is disoriented but no longer violent.  Eating Mayonnaise packets with the foil container.    Given Bendaryl 50, ativan and Haldol 5.   IM   Allowed us to place Midline for access.  Back to resting now.  - would start QHS seroquel or IM QHS zyprexa if unwilling to take oral.

## 2023-11-04 NOTE — PROVIDER CONTACT NOTE (CHANGE IN STATUS NOTIFICATION) - ACTION/TREATMENT ORDERED:
Haldol 5mg, Ativan 2 mg, and benedryl 50mg given.
Security able to deesculate situation at this time. Pt redirected and currently in room. CO remains in place; PCA at bedside, will continue to monitor patient.

## 2023-11-05 DIAGNOSIS — R50.9 FEVER, UNSPECIFIED: ICD-10-CM

## 2023-11-05 LAB
APPEARANCE UR: CLEAR — SIGNIFICANT CHANGE UP
APPEARANCE UR: CLEAR — SIGNIFICANT CHANGE UP
BILIRUB UR-MCNC: NEGATIVE — SIGNIFICANT CHANGE UP
BILIRUB UR-MCNC: NEGATIVE — SIGNIFICANT CHANGE UP
COLOR SPEC: SIGNIFICANT CHANGE UP
COLOR SPEC: SIGNIFICANT CHANGE UP
DIFF PNL FLD: NEGATIVE — SIGNIFICANT CHANGE UP
DIFF PNL FLD: NEGATIVE — SIGNIFICANT CHANGE UP
GLUCOSE UR QL: NEGATIVE MG/DL — SIGNIFICANT CHANGE UP
GLUCOSE UR QL: NEGATIVE MG/DL — SIGNIFICANT CHANGE UP
KETONES UR-MCNC: ABNORMAL MG/DL
KETONES UR-MCNC: ABNORMAL MG/DL
LEUKOCYTE ESTERASE UR-ACNC: NEGATIVE — SIGNIFICANT CHANGE UP
LEUKOCYTE ESTERASE UR-ACNC: NEGATIVE — SIGNIFICANT CHANGE UP
NITRITE UR-MCNC: NEGATIVE — SIGNIFICANT CHANGE UP
NITRITE UR-MCNC: NEGATIVE — SIGNIFICANT CHANGE UP
PH UR: 6.5 — SIGNIFICANT CHANGE UP (ref 5–8)
PH UR: 6.5 — SIGNIFICANT CHANGE UP (ref 5–8)
PROT UR-MCNC: NEGATIVE MG/DL — SIGNIFICANT CHANGE UP
PROT UR-MCNC: NEGATIVE MG/DL — SIGNIFICANT CHANGE UP
SP GR SPEC: 1.02 — SIGNIFICANT CHANGE UP (ref 1–1.03)
SP GR SPEC: 1.02 — SIGNIFICANT CHANGE UP (ref 1–1.03)
UROBILINOGEN FLD QL: 1 MG/DL — SIGNIFICANT CHANGE UP (ref 0.2–1)
UROBILINOGEN FLD QL: 1 MG/DL — SIGNIFICANT CHANGE UP (ref 0.2–1)

## 2023-11-05 PROCEDURE — 71045 X-RAY EXAM CHEST 1 VIEW: CPT | Mod: 26

## 2023-11-05 PROCEDURE — 99233 SBSQ HOSP IP/OBS HIGH 50: CPT

## 2023-11-05 RX ORDER — ACETAMINOPHEN 500 MG
650 TABLET ORAL EVERY 6 HOURS
Refills: 0 | Status: DISCONTINUED | OUTPATIENT
Start: 2023-11-05 | End: 2023-11-10

## 2023-11-05 RX ORDER — ACETAMINOPHEN 500 MG
1000 TABLET ORAL ONCE
Refills: 0 | Status: COMPLETED | OUTPATIENT
Start: 2023-11-05 | End: 2023-11-05

## 2023-11-05 RX ADMIN — OXCARBAZEPINE 300 MILLIGRAM(S): 300 TABLET, FILM COATED ORAL at 05:43

## 2023-11-05 RX ADMIN — BRIVARACETAM 100 MILLIGRAM(S): 25 TABLET, FILM COATED ORAL at 05:41

## 2023-11-05 RX ADMIN — Medication 200 MILLIGRAM(S): at 05:43

## 2023-11-05 RX ADMIN — BRIVARACETAM 100 MILLIGRAM(S): 25 TABLET, FILM COATED ORAL at 17:05

## 2023-11-05 RX ADMIN — LACOSAMIDE 150 MILLIGRAM(S): 50 TABLET ORAL at 17:05

## 2023-11-05 RX ADMIN — OXCARBAZEPINE 300 MILLIGRAM(S): 300 TABLET, FILM COATED ORAL at 17:05

## 2023-11-05 RX ADMIN — Medication 200 MILLIGRAM(S): at 21:27

## 2023-11-05 RX ADMIN — DIVALPROEX SODIUM 750 MILLIGRAM(S): 500 TABLET, DELAYED RELEASE ORAL at 05:43

## 2023-11-05 RX ADMIN — Medication 2 MILLIGRAM(S): at 19:37

## 2023-11-05 RX ADMIN — Medication 650 MILLIGRAM(S): at 07:30

## 2023-11-05 RX ADMIN — QUETIAPINE FUMARATE 100 MILLIGRAM(S): 200 TABLET, FILM COATED ORAL at 21:27

## 2023-11-05 RX ADMIN — Medication 650 MILLIGRAM(S): at 05:40

## 2023-11-05 RX ADMIN — Medication 2 MILLIGRAM(S): at 01:29

## 2023-11-05 RX ADMIN — Medication 15 MILLIGRAM(S): at 05:45

## 2023-11-05 RX ADMIN — DIVALPROEX SODIUM 750 MILLIGRAM(S): 500 TABLET, DELAYED RELEASE ORAL at 17:05

## 2023-11-05 RX ADMIN — Medication 1000 MILLIGRAM(S): at 07:30

## 2023-11-05 RX ADMIN — Medication 400 MILLIGRAM(S): at 01:56

## 2023-11-05 RX ADMIN — PANTOPRAZOLE SODIUM 40 MILLIGRAM(S): 20 TABLET, DELAYED RELEASE ORAL at 05:44

## 2023-11-05 RX ADMIN — LACOSAMIDE 150 MILLIGRAM(S): 50 TABLET ORAL at 05:42

## 2023-11-05 NOTE — PROGRESS NOTE ADULT - SUBJECTIVE AND OBJECTIVE BOX
Patient is a 28y old  Male who presents with a chief complaint of Breakthrough seizure. (04 Nov 2023 16:24)      SUBJECTIVE / OVERNIGHT EVENTS: Patient seen and examined at bedside. Intermittently agitated yesterday. Also had a low grade fever of 100.5. Calm this morning.    MEDICATIONS  (STANDING):  benzonatate 200 milliGRAM(s) Oral three times a day  brivaracetam 100 milliGRAM(s) Oral two times a day  cyanocobalamin 1000 MICROGram(s) Oral <User Schedule>  divalproex  milliGRAM(s) Oral two times a day  lacosamide 150 milliGRAM(s) Oral two times a day  OXcarbazepine 300 milliGRAM(s) Oral two times a day  pantoprazole    Tablet 40 milliGRAM(s) Oral before breakfast  predniSONE   Tablet 15 milliGRAM(s) Oral daily  QUEtiapine 100 milliGRAM(s) Oral at bedtime    MEDICATIONS  (PRN):  acetaminophen     Tablet .. 650 milliGRAM(s) Oral every 6 hours PRN Temp greater or equal to 38C (100.4F), Mild Pain (1 - 3)  haloperidol    Injectable 5 milliGRAM(s) IntraMuscular every 6 hours PRN Severe Agitation  LORazepam   Injectable 2 milliGRAM(s) IV Push every 4 hours PRN Agitation  LORazepam   Injectable 2 milliGRAM(s) IntraMuscular every 2 hours PRN Seizure like activity  melatonin 3 milliGRAM(s) Oral at bedtime PRN Insomnia      CAPILLARY BLOOD GLUCOSE        I&O's Summary    04 Nov 2023 08:01  -  05 Nov 2023 07:00  --------------------------------------------------------  IN: 720 mL / OUT: 260 mL / NET: 460 mL        PHYSICAL EXAM:  Vital Signs Last 24 Hrs  T(C): 38.1 (05 Nov 2023 05:09), Max: 38.1 (05 Nov 2023 05:09)  T(F): 100.5 (05 Nov 2023 05:09), Max: 100.5 (05 Nov 2023 05:09)  HR: 118 (05 Nov 2023 05:09) (69 - 118)  BP: 96/61 (05 Nov 2023 05:09) (96/61 - 124/74)  BP(mean): --  RR: 18 (05 Nov 2023 05:09) (16 - 18)  SpO2: 93% (05 Nov 2023 05:11) (88% - 94%)    Parameters below as of 04 Nov 2023 17:00  Patient On (Oxygen Delivery Method): room air        GEN: male in NAD, appears comfortable, no diaphoresis  EYES: No scleral injection, EOMI  ENTM: neck supple & symmetric without tracheal deviation, moist membranes, no gross hearing impairment, thyroid gland not enlarged  CV: +S1/S2, no m/r/g, no abdominal bruit, no LE edema  RESP: breathing comfortably, no respiratory accessory muscle use, CTAB, no w/r/r  GI: normoactive BS, soft, NTND, no rebounding/guarding, no palpable masses    LABS:                      RADIOLOGY & ADDITIONAL TESTS:  Results Reviewed:   Imaging Personally Reviewed:  Electrocardiogram Personally Reviewed:    COORDINATION OF CARE:  Care Discussed with Consultants/Other Providers [Y/N]:  Prior or Outpatient Records Reviewed [Y/N]:

## 2023-11-05 NOTE — PROGRESS NOTE ADULT - PROBLEM SELECTOR PLAN 5
Mildly elevated LFT's possibly medication induced      Denies alcohol abuse.  F/up hepatitis panel  Trend LFT's
Mildly elevated LFT's possibly medication induced      Monitor
RESOLVED and likely pre-renal

## 2023-11-05 NOTE — PROGRESS NOTE ADULT - ASSESSMENT
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 Currently post-ictal and agitated and pending transfer to [facility] in [place], [place].

## 2023-11-05 NOTE — PROGRESS NOTE ADULT - PROBLEM SELECTOR PLAN 1
Seizure d/o with breakthrough seizures  Unclear if patient has been compliant w/ meds   Valproic level 85 (normal range ) and Oxcarbazepine level pending  CT head negative.  Seizure precaution.  Follow Oxcarbazepine level  EEG ordered   Continue his Depakote 750 mg bid, Vimpat 150 mg bid, Oxcarbazepine 300 mg bid.  Consulted neurology
Seizure d/o with breakthrough seizures due to prior encephalitis  CT head on admission unremarkable  Unable to perform EEG due to intermittent agitated  Unable to perform MRI, but given transfer can be deferred  Seizure precautions  Neurology consult appreciated  Continue with: Briviact 100mg BID, Depakote 750mg BID, Vimpat 150mg BID, and Trileptal 300mg BID
Seizure d/o with breakthrough seizures due to prior encephalitis  CT head on admission unremarkable  Unable to perform EEG due to intermittent agitated  Unable to perform MRI, but given transfer can be deferred  Seizure precautions  Neurology consult appreciated  Continue with: Briviact 100mg BID, Depakote 750mg BID, Vimpat 150mg BID, and Trileptal 300mg BID

## 2023-11-05 NOTE — PROGRESS NOTE ADULT - PROBLEM SELECTOR PLAN 6
Defer due to intermittent agitation, risk of DVT is low
Lovenox sq daily.
Mildly elevated LFT's possibly medication induced      Monitor

## 2023-11-05 NOTE — PROGRESS NOTE ADULT - PROBLEM SELECTOR PLAN 2
Agitation in setting of post-ictal state   -patient removed IV access   -c/w Haldol 2mg q6h IM prn agitation (QTc 414)   -on constant observation d/t risk of elopement and risk of fall
Developed low grade on 11/4   May be central fever?  Send UA and perform CXR  Monitor fever curve off antimicrobials
Agitation in setting of post-ictal state   Haldol 5 mg IM prn or Ativan 2 mg IM (can stack if needed)  Seroquel qhs

## 2023-11-05 NOTE — PROGRESS NOTE ADULT - PROBLEM SELECTOR PLAN 4
Mild LUIS CARLOS, likely pre-renal   Ordered for IV fluids but unable to receive d/t agitation and lack of IV access   Monitor Cr
Continue with Prednisone 15 mg daily  Off PJP PPx given <20 mg
RESOLVED and likely pre-renal

## 2023-11-05 NOTE — PROGRESS NOTE ADULT - PROBLEM SELECTOR PLAN 3
H/o recent Encephalitis.  Continue his Clarithromycin 500 mg bid, Amoxicillin 1000 mg bid, Bactrim-DS (800-160) 1 tab on Mon/Wed/Friday (3 days in a week).   Continue Prednisone 15 mg po daily.  Clarithromycin  is not available in in-patient pharmacy.
Agitation in setting of post-ictal state   Haldol 5 mg IM prn or Ativan 2 mg IM (can stack if needed)  Seroquel qhs
Continue with Prednisone 15 mg daily  Off PJP PPx given <20 mg

## 2023-11-06 LAB
ANION GAP SERPL CALC-SCNC: 6 MMOL/L — SIGNIFICANT CHANGE UP (ref 5–17)
ANION GAP SERPL CALC-SCNC: 6 MMOL/L — SIGNIFICANT CHANGE UP (ref 5–17)
BUN SERPL-MCNC: 15 MG/DL — SIGNIFICANT CHANGE UP (ref 7–23)
BUN SERPL-MCNC: 15 MG/DL — SIGNIFICANT CHANGE UP (ref 7–23)
CALCIUM SERPL-MCNC: 9.3 MG/DL — SIGNIFICANT CHANGE UP (ref 8.5–10.1)
CALCIUM SERPL-MCNC: 9.3 MG/DL — SIGNIFICANT CHANGE UP (ref 8.5–10.1)
CHLORIDE SERPL-SCNC: 104 MMOL/L — SIGNIFICANT CHANGE UP (ref 96–108)
CHLORIDE SERPL-SCNC: 104 MMOL/L — SIGNIFICANT CHANGE UP (ref 96–108)
CO2 SERPL-SCNC: 31 MMOL/L — SIGNIFICANT CHANGE UP (ref 22–31)
CO2 SERPL-SCNC: 31 MMOL/L — SIGNIFICANT CHANGE UP (ref 22–31)
CREAT SERPL-MCNC: 1.06 MG/DL — SIGNIFICANT CHANGE UP (ref 0.5–1.3)
CREAT SERPL-MCNC: 1.06 MG/DL — SIGNIFICANT CHANGE UP (ref 0.5–1.3)
CULTURE RESULTS: SIGNIFICANT CHANGE UP
CULTURE RESULTS: SIGNIFICANT CHANGE UP
EGFR: 97 ML/MIN/1.73M2 — SIGNIFICANT CHANGE UP
EGFR: 97 ML/MIN/1.73M2 — SIGNIFICANT CHANGE UP
GLUCOSE SERPL-MCNC: 97 MG/DL — SIGNIFICANT CHANGE UP (ref 70–99)
GLUCOSE SERPL-MCNC: 97 MG/DL — SIGNIFICANT CHANGE UP (ref 70–99)
HCT VFR BLD CALC: 48.1 % — SIGNIFICANT CHANGE UP (ref 39–50)
HCT VFR BLD CALC: 48.1 % — SIGNIFICANT CHANGE UP (ref 39–50)
HGB BLD-MCNC: 15.1 G/DL — SIGNIFICANT CHANGE UP (ref 13–17)
HGB BLD-MCNC: 15.1 G/DL — SIGNIFICANT CHANGE UP (ref 13–17)
LACTATE SERPL-SCNC: 4 MMOL/L — CRITICAL HIGH (ref 0.7–2)
LACTATE SERPL-SCNC: 4 MMOL/L — CRITICAL HIGH (ref 0.7–2)
MCHC RBC-ENTMCNC: 26.6 PG — LOW (ref 27–34)
MCHC RBC-ENTMCNC: 26.6 PG — LOW (ref 27–34)
MCHC RBC-ENTMCNC: 31.4 G/DL — LOW (ref 32–36)
MCHC RBC-ENTMCNC: 31.4 G/DL — LOW (ref 32–36)
MCV RBC AUTO: 84.8 FL — SIGNIFICANT CHANGE UP (ref 80–100)
MCV RBC AUTO: 84.8 FL — SIGNIFICANT CHANGE UP (ref 80–100)
NRBC # BLD: 0 /100 WBCS — SIGNIFICANT CHANGE UP (ref 0–0)
NRBC # BLD: 0 /100 WBCS — SIGNIFICANT CHANGE UP (ref 0–0)
PLATELET # BLD AUTO: 257 K/UL — SIGNIFICANT CHANGE UP (ref 150–400)
PLATELET # BLD AUTO: 257 K/UL — SIGNIFICANT CHANGE UP (ref 150–400)
POTASSIUM SERPL-MCNC: 3.6 MMOL/L — SIGNIFICANT CHANGE UP (ref 3.5–5.3)
POTASSIUM SERPL-MCNC: 3.6 MMOL/L — SIGNIFICANT CHANGE UP (ref 3.5–5.3)
POTASSIUM SERPL-SCNC: 3.6 MMOL/L — SIGNIFICANT CHANGE UP (ref 3.5–5.3)
POTASSIUM SERPL-SCNC: 3.6 MMOL/L — SIGNIFICANT CHANGE UP (ref 3.5–5.3)
RAPID RVP RESULT: SIGNIFICANT CHANGE UP
RAPID RVP RESULT: SIGNIFICANT CHANGE UP
RBC # BLD: 5.67 M/UL — SIGNIFICANT CHANGE UP (ref 4.2–5.8)
RBC # BLD: 5.67 M/UL — SIGNIFICANT CHANGE UP (ref 4.2–5.8)
RBC # FLD: 16.5 % — HIGH (ref 10.3–14.5)
RBC # FLD: 16.5 % — HIGH (ref 10.3–14.5)
SARS-COV-2 RNA SPEC QL NAA+PROBE: SIGNIFICANT CHANGE UP
SARS-COV-2 RNA SPEC QL NAA+PROBE: SIGNIFICANT CHANGE UP
SODIUM SERPL-SCNC: 141 MMOL/L — SIGNIFICANT CHANGE UP (ref 135–145)
SODIUM SERPL-SCNC: 141 MMOL/L — SIGNIFICANT CHANGE UP (ref 135–145)
SPECIMEN SOURCE: SIGNIFICANT CHANGE UP
SPECIMEN SOURCE: SIGNIFICANT CHANGE UP
WBC # BLD: 17.87 K/UL — HIGH (ref 3.8–10.5)
WBC # BLD: 17.87 K/UL — HIGH (ref 3.8–10.5)
WBC # FLD AUTO: 17.87 K/UL — HIGH (ref 3.8–10.5)
WBC # FLD AUTO: 17.87 K/UL — HIGH (ref 3.8–10.5)

## 2023-11-06 PROCEDURE — 99232 SBSQ HOSP IP/OBS MODERATE 35: CPT

## 2023-11-06 RX ORDER — SODIUM CHLORIDE 9 MG/ML
1000 INJECTION INTRAMUSCULAR; INTRAVENOUS; SUBCUTANEOUS
Refills: 0 | Status: DISCONTINUED | OUTPATIENT
Start: 2023-11-06 | End: 2023-11-07

## 2023-11-06 RX ORDER — SODIUM CHLORIDE 9 MG/ML
1000 INJECTION, SOLUTION INTRAVENOUS
Refills: 0 | Status: DISCONTINUED | OUTPATIENT
Start: 2023-11-06 | End: 2023-11-07

## 2023-11-06 RX ORDER — ACETAMINOPHEN 500 MG
1000 TABLET ORAL ONCE
Refills: 0 | Status: COMPLETED | OUTPATIENT
Start: 2023-11-06 | End: 2023-11-06

## 2023-11-06 RX ORDER — SODIUM CHLORIDE 9 MG/ML
500 INJECTION INTRAMUSCULAR; INTRAVENOUS; SUBCUTANEOUS ONCE
Refills: 0 | Status: COMPLETED | OUTPATIENT
Start: 2023-11-06 | End: 2023-11-06

## 2023-11-06 RX ADMIN — OXCARBAZEPINE 300 MILLIGRAM(S): 300 TABLET, FILM COATED ORAL at 17:11

## 2023-11-06 RX ADMIN — Medication 15 MILLIGRAM(S): at 05:53

## 2023-11-06 RX ADMIN — OXCARBAZEPINE 300 MILLIGRAM(S): 300 TABLET, FILM COATED ORAL at 05:53

## 2023-11-06 RX ADMIN — QUETIAPINE FUMARATE 100 MILLIGRAM(S): 200 TABLET, FILM COATED ORAL at 23:26

## 2023-11-06 RX ADMIN — Medication 400 MILLIGRAM(S): at 13:35

## 2023-11-06 RX ADMIN — LACOSAMIDE 150 MILLIGRAM(S): 50 TABLET ORAL at 05:53

## 2023-11-06 RX ADMIN — DIVALPROEX SODIUM 750 MILLIGRAM(S): 500 TABLET, DELAYED RELEASE ORAL at 05:53

## 2023-11-06 RX ADMIN — LACOSAMIDE 150 MILLIGRAM(S): 50 TABLET ORAL at 17:09

## 2023-11-06 RX ADMIN — PANTOPRAZOLE SODIUM 40 MILLIGRAM(S): 20 TABLET, DELAYED RELEASE ORAL at 06:25

## 2023-11-06 RX ADMIN — DIVALPROEX SODIUM 750 MILLIGRAM(S): 500 TABLET, DELAYED RELEASE ORAL at 17:09

## 2023-11-06 RX ADMIN — SODIUM CHLORIDE 1000 MILLILITER(S): 9 INJECTION INTRAMUSCULAR; INTRAVENOUS; SUBCUTANEOUS at 22:57

## 2023-11-06 RX ADMIN — Medication 1000 MILLIGRAM(S): at 14:02

## 2023-11-06 RX ADMIN — BRIVARACETAM 100 MILLIGRAM(S): 25 TABLET, FILM COATED ORAL at 05:52

## 2023-11-06 RX ADMIN — SODIUM CHLORIDE 100 MILLILITER(S): 9 INJECTION, SOLUTION INTRAVENOUS at 14:57

## 2023-11-06 RX ADMIN — BRIVARACETAM 100 MILLIGRAM(S): 25 TABLET, FILM COATED ORAL at 17:10

## 2023-11-06 RX ADMIN — Medication 200 MILLIGRAM(S): at 05:54

## 2023-11-06 RX ADMIN — Medication 400 MILLIGRAM(S): at 23:41

## 2023-11-06 RX ADMIN — Medication 200 MILLIGRAM(S): at 23:13

## 2023-11-06 NOTE — PROVIDER CONTACT NOTE (OTHER) - BACKGROUND
admitted with seizure, lethargic, elevated  wbc and pt  had fever earlier during the day with lots of gurgling cough and secretion to review chest Xray

## 2023-11-06 NOTE — PROGRESS NOTE ADULT - SUBJECTIVE AND OBJECTIVE BOX
Patient is a 28y old  Male who presents with a chief complaint of Breakthrough seizure. (05 Nov 2023 12:49)      INTERVAL HPI/OVERNIGHT EVENTS: No acute events overnight.     MEDICATIONS  (STANDING):  benzonatate 200 milliGRAM(s) Oral three times a day  brivaracetam 100 milliGRAM(s) Oral two times a day  cyanocobalamin 1000 MICROGram(s) Oral <User Schedule>  divalproex  milliGRAM(s) Oral two times a day  lacosamide 150 milliGRAM(s) Oral two times a day  OXcarbazepine 300 milliGRAM(s) Oral two times a day  pantoprazole    Tablet 40 milliGRAM(s) Oral before breakfast  predniSONE   Tablet 15 milliGRAM(s) Oral daily  QUEtiapine 100 milliGRAM(s) Oral at bedtime    MEDICATIONS  (PRN):  acetaminophen     Tablet .. 650 milliGRAM(s) Oral every 6 hours PRN Temp greater or equal to 38C (100.4F), Mild Pain (1 - 3)  haloperidol    Injectable 5 milliGRAM(s) IntraMuscular every 6 hours PRN Severe Agitation  LORazepam   Injectable 2 milliGRAM(s) IV Push every 4 hours PRN Agitation  LORazepam   Injectable 2 milliGRAM(s) IntraMuscular every 2 hours PRN Seizure like activity  melatonin 3 milliGRAM(s) Oral at bedtime PRN Insomnia      Allergies    No Known Allergies    Intolerances        REVIEW OF SYSTEMS:  Unobtainable due to altered mental status.     Vital Signs Last 24 Hrs  T(C): 38.4 (06 Nov 2023 11:39), Max: 38.4 (06 Nov 2023 11:39)  T(F): 101.2 (06 Nov 2023 11:39), Max: 101.2 (06 Nov 2023 11:39)  HR: 108 (06 Nov 2023 11:45) (91 - 119)  BP: 123/79 (06 Nov 2023 11:39) (123/79 - 134/77)  BP(mean): --  RR: 18 (06 Nov 2023 11:45) (16 - 18)  SpO2: 92% (06 Nov 2023 11:45) (88% - 93%)    Parameters below as of 06 Nov 2023 11:45  Patient On (Oxygen Delivery Method): room air        PHYSICAL EXAM:    HEAD:  Atraumatic, Normocephalic  EYES: EOMI, PERRLA, conjunctiva and sclera clear  ENMT: No tonsillar erythema, lesions    LABS:                        15.1   17.87 )-----------( 257      ( 06 Nov 2023 07:00 )             48.1     11-06    141  |  104  |  15  ----------------------------<  97  3.6   |  31  |  1.06    Ca    9.3      06 Nov 2023 07:00        Urinalysis Basic - ( 06 Nov 2023 07:00 )    Color: x / Appearance: x / SG: x / pH: x  Gluc: 97 mg/dL / Ketone: x  / Bili: x / Urobili: x   Blood: x / Protein: x / Nitrite: x   Leuk Esterase: x / RBC: x / WBC x   Sq Epi: x / Non Sq Epi: x / Bacteria: x

## 2023-11-06 NOTE — PROGRESS NOTE ADULT - ASSESSMENT
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 Currently post-ictal and agitated and pending transfer to [facility] in [place], [place].       Problem/Plan - 1:  ·  Problem: Seizure.   ·  Plan: Seizure d/o with breakthrough seizures due to prior encephalitis  CT head on admission unremarkable  Unable to perform EEG due to intermittent agitated  Unable to perform MRI, but given transfer can be deferred  Seizure precautions  Neurology consult appreciated  Continue with: Briviact 100mg BID, Depakote 750mg BID, Vimpat 150mg BID, and Trileptal 300mg BID.     Problem/Plan - 2:  ·  Problem: Fever.   ·  Plan: Developed low grade on 11/4   May be central fever?  Send UA and perform CXR  Monitor fever curve off antimicrobials.     Problem/Plan - 3:  ·  Problem: Agitation.   ·  Plan: Agitation in setting of post-ictal state   Haldol 5 mg IM prn or Ativan 2 mg IM (can stack if needed)  Seroquel qhs.     Problem/Plan - 4:  ·  Problem: Infectious encephalitis.   ·  Plan: Continue with Prednisone 15 mg daily  Off PJP PPx given <20 mg.     Problem/Plan - 5:  ·  Problem: LUIS CARLOS (acute kidney injury).   ·  Plan: RESOLVED and likely pre-renal.     Problem/Plan - 6:  ·  Problem: Elevated LFTs.   ·  Plan: Mildly elevated LFT's possibly medication induced      Monitor.     Problem/Plan - 7:  ·  Problem: Need for prophylactic measure.   ·  Plan: Defer due to intermittent agitation, risk of DVT is low    Patient likely to be transported on 11/8.        Mehran Arizmendi MD

## 2023-11-06 NOTE — PROVIDER CONTACT NOTE (OTHER) - REASON
elevated  wbc and pt  had fever earlier during the day with lots of gurgling cough and secretion to review chest Xray

## 2023-11-07 LAB
ALBUMIN SERPL ELPH-MCNC: 2.7 G/DL — LOW (ref 3.3–5)
ALBUMIN SERPL ELPH-MCNC: 2.7 G/DL — LOW (ref 3.3–5)
ALP SERPL-CCNC: 61 U/L — SIGNIFICANT CHANGE UP (ref 40–120)
ALP SERPL-CCNC: 61 U/L — SIGNIFICANT CHANGE UP (ref 40–120)
ALT FLD-CCNC: 40 U/L — SIGNIFICANT CHANGE UP (ref 12–78)
ALT FLD-CCNC: 40 U/L — SIGNIFICANT CHANGE UP (ref 12–78)
ANION GAP SERPL CALC-SCNC: 4 MMOL/L — LOW (ref 5–17)
ANION GAP SERPL CALC-SCNC: 4 MMOL/L — LOW (ref 5–17)
AST SERPL-CCNC: 14 U/L — LOW (ref 15–37)
AST SERPL-CCNC: 14 U/L — LOW (ref 15–37)
BILIRUB SERPL-MCNC: 0.4 MG/DL — SIGNIFICANT CHANGE UP (ref 0.2–1.2)
BILIRUB SERPL-MCNC: 0.4 MG/DL — SIGNIFICANT CHANGE UP (ref 0.2–1.2)
BUN SERPL-MCNC: 12 MG/DL — SIGNIFICANT CHANGE UP (ref 7–23)
BUN SERPL-MCNC: 12 MG/DL — SIGNIFICANT CHANGE UP (ref 7–23)
CALCIUM SERPL-MCNC: 9 MG/DL — SIGNIFICANT CHANGE UP (ref 8.5–10.1)
CALCIUM SERPL-MCNC: 9 MG/DL — SIGNIFICANT CHANGE UP (ref 8.5–10.1)
CHLORIDE SERPL-SCNC: 105 MMOL/L — SIGNIFICANT CHANGE UP (ref 96–108)
CHLORIDE SERPL-SCNC: 105 MMOL/L — SIGNIFICANT CHANGE UP (ref 96–108)
CO2 SERPL-SCNC: 30 MMOL/L — SIGNIFICANT CHANGE UP (ref 22–31)
CO2 SERPL-SCNC: 30 MMOL/L — SIGNIFICANT CHANGE UP (ref 22–31)
CREAT SERPL-MCNC: 0.96 MG/DL — SIGNIFICANT CHANGE UP (ref 0.5–1.3)
CREAT SERPL-MCNC: 0.96 MG/DL — SIGNIFICANT CHANGE UP (ref 0.5–1.3)
EGFR: 109 ML/MIN/1.73M2 — SIGNIFICANT CHANGE UP
EGFR: 109 ML/MIN/1.73M2 — SIGNIFICANT CHANGE UP
GLUCOSE SERPL-MCNC: 128 MG/DL — HIGH (ref 70–99)
GLUCOSE SERPL-MCNC: 128 MG/DL — HIGH (ref 70–99)
HCT VFR BLD CALC: 43.6 % — SIGNIFICANT CHANGE UP (ref 39–50)
HCT VFR BLD CALC: 43.6 % — SIGNIFICANT CHANGE UP (ref 39–50)
HCT VFR BLD CALC: 43.9 % — SIGNIFICANT CHANGE UP (ref 39–50)
HCT VFR BLD CALC: 43.9 % — SIGNIFICANT CHANGE UP (ref 39–50)
HGB BLD-MCNC: 13.9 G/DL — SIGNIFICANT CHANGE UP (ref 13–17)
LACTATE SERPL-SCNC: 1.2 MMOL/L — SIGNIFICANT CHANGE UP (ref 0.7–2)
LACTATE SERPL-SCNC: 1.2 MMOL/L — SIGNIFICANT CHANGE UP (ref 0.7–2)
LACTATE SERPL-SCNC: 2 MMOL/L — SIGNIFICANT CHANGE UP (ref 0.7–2)
LACTATE SERPL-SCNC: 2 MMOL/L — SIGNIFICANT CHANGE UP (ref 0.7–2)
LEGIONELLA AG UR QL: NEGATIVE — SIGNIFICANT CHANGE UP
LEGIONELLA AG UR QL: NEGATIVE — SIGNIFICANT CHANGE UP
MAGNESIUM SERPL-MCNC: 2.2 MG/DL — SIGNIFICANT CHANGE UP (ref 1.6–2.6)
MAGNESIUM SERPL-MCNC: 2.2 MG/DL — SIGNIFICANT CHANGE UP (ref 1.6–2.6)
MCHC RBC-ENTMCNC: 26.5 PG — LOW (ref 27–34)
MCHC RBC-ENTMCNC: 26.5 PG — LOW (ref 27–34)
MCHC RBC-ENTMCNC: 26.9 PG — LOW (ref 27–34)
MCHC RBC-ENTMCNC: 26.9 PG — LOW (ref 27–34)
MCHC RBC-ENTMCNC: 31.7 G/DL — LOW (ref 32–36)
MCHC RBC-ENTMCNC: 31.7 G/DL — LOW (ref 32–36)
MCHC RBC-ENTMCNC: 31.9 G/DL — LOW (ref 32–36)
MCHC RBC-ENTMCNC: 31.9 G/DL — LOW (ref 32–36)
MCV RBC AUTO: 83.8 FL — SIGNIFICANT CHANGE UP (ref 80–100)
MCV RBC AUTO: 83.8 FL — SIGNIFICANT CHANGE UP (ref 80–100)
MCV RBC AUTO: 84.3 FL — SIGNIFICANT CHANGE UP (ref 80–100)
MCV RBC AUTO: 84.3 FL — SIGNIFICANT CHANGE UP (ref 80–100)
MRSA PCR RESULT.: SIGNIFICANT CHANGE UP
MRSA PCR RESULT.: SIGNIFICANT CHANGE UP
NRBC # BLD: 0 /100 WBCS — SIGNIFICANT CHANGE UP (ref 0–0)
PHOSPHATE SERPL-MCNC: 2.5 MG/DL — SIGNIFICANT CHANGE UP (ref 2.5–4.5)
PHOSPHATE SERPL-MCNC: 2.5 MG/DL — SIGNIFICANT CHANGE UP (ref 2.5–4.5)
PLATELET # BLD AUTO: 232 K/UL — SIGNIFICANT CHANGE UP (ref 150–400)
PLATELET # BLD AUTO: 232 K/UL — SIGNIFICANT CHANGE UP (ref 150–400)
PLATELET # BLD AUTO: 245 K/UL — SIGNIFICANT CHANGE UP (ref 150–400)
PLATELET # BLD AUTO: 245 K/UL — SIGNIFICANT CHANGE UP (ref 150–400)
POTASSIUM SERPL-MCNC: 3.8 MMOL/L — SIGNIFICANT CHANGE UP (ref 3.5–5.3)
POTASSIUM SERPL-MCNC: 3.8 MMOL/L — SIGNIFICANT CHANGE UP (ref 3.5–5.3)
POTASSIUM SERPL-SCNC: 3.8 MMOL/L — SIGNIFICANT CHANGE UP (ref 3.5–5.3)
POTASSIUM SERPL-SCNC: 3.8 MMOL/L — SIGNIFICANT CHANGE UP (ref 3.5–5.3)
PROT SERPL-MCNC: 7.1 GM/DL — SIGNIFICANT CHANGE UP (ref 6–8.3)
PROT SERPL-MCNC: 7.1 GM/DL — SIGNIFICANT CHANGE UP (ref 6–8.3)
RBC # BLD: 5.17 M/UL — SIGNIFICANT CHANGE UP (ref 4.2–5.8)
RBC # BLD: 5.17 M/UL — SIGNIFICANT CHANGE UP (ref 4.2–5.8)
RBC # BLD: 5.24 M/UL — SIGNIFICANT CHANGE UP (ref 4.2–5.8)
RBC # BLD: 5.24 M/UL — SIGNIFICANT CHANGE UP (ref 4.2–5.8)
RBC # FLD: 16.4 % — HIGH (ref 10.3–14.5)
RBC # FLD: 16.4 % — HIGH (ref 10.3–14.5)
RBC # FLD: 16.5 % — HIGH (ref 10.3–14.5)
RBC # FLD: 16.5 % — HIGH (ref 10.3–14.5)
S AUREUS DNA NOSE QL NAA+PROBE: SIGNIFICANT CHANGE UP
S AUREUS DNA NOSE QL NAA+PROBE: SIGNIFICANT CHANGE UP
S PNEUM AG UR QL: NEGATIVE — SIGNIFICANT CHANGE UP
S PNEUM AG UR QL: NEGATIVE — SIGNIFICANT CHANGE UP
SODIUM SERPL-SCNC: 139 MMOL/L — SIGNIFICANT CHANGE UP (ref 135–145)
SODIUM SERPL-SCNC: 139 MMOL/L — SIGNIFICANT CHANGE UP (ref 135–145)
WBC # BLD: 14.62 K/UL — HIGH (ref 3.8–10.5)
WBC # BLD: 14.62 K/UL — HIGH (ref 3.8–10.5)
WBC # BLD: 14.75 K/UL — HIGH (ref 3.8–10.5)
WBC # BLD: 14.75 K/UL — HIGH (ref 3.8–10.5)
WBC # FLD AUTO: 14.62 K/UL — HIGH (ref 3.8–10.5)
WBC # FLD AUTO: 14.62 K/UL — HIGH (ref 3.8–10.5)
WBC # FLD AUTO: 14.75 K/UL — HIGH (ref 3.8–10.5)
WBC # FLD AUTO: 14.75 K/UL — HIGH (ref 3.8–10.5)

## 2023-11-07 PROCEDURE — 71045 X-RAY EXAM CHEST 1 VIEW: CPT | Mod: 26

## 2023-11-07 PROCEDURE — 99232 SBSQ HOSP IP/OBS MODERATE 35: CPT

## 2023-11-07 RX ORDER — SODIUM CHLORIDE 9 MG/ML
1000 INJECTION, SOLUTION INTRAVENOUS
Refills: 0 | Status: DISCONTINUED | OUTPATIENT
Start: 2023-11-07 | End: 2023-11-09

## 2023-11-07 RX ORDER — VANCOMYCIN HCL 1 G
1750 VIAL (EA) INTRAVENOUS ONCE
Refills: 0 | Status: COMPLETED | OUTPATIENT
Start: 2023-11-07 | End: 2023-11-07

## 2023-11-07 RX ADMIN — PANTOPRAZOLE SODIUM 40 MILLIGRAM(S): 20 TABLET, DELAYED RELEASE ORAL at 06:09

## 2023-11-07 RX ADMIN — OXCARBAZEPINE 300 MILLIGRAM(S): 300 TABLET, FILM COATED ORAL at 17:08

## 2023-11-07 RX ADMIN — Medication 1000 MILLIGRAM(S): at 00:09

## 2023-11-07 RX ADMIN — BRIVARACETAM 100 MILLIGRAM(S): 25 TABLET, FILM COATED ORAL at 17:08

## 2023-11-07 RX ADMIN — Medication 2 MILLIGRAM(S): at 18:14

## 2023-11-07 RX ADMIN — DIVALPROEX SODIUM 750 MILLIGRAM(S): 500 TABLET, DELAYED RELEASE ORAL at 17:09

## 2023-11-07 RX ADMIN — Medication 200 MILLIGRAM(S): at 17:10

## 2023-11-07 RX ADMIN — Medication 250 MILLIGRAM(S): at 01:18

## 2023-11-07 RX ADMIN — DIVALPROEX SODIUM 750 MILLIGRAM(S): 500 TABLET, DELAYED RELEASE ORAL at 06:09

## 2023-11-07 RX ADMIN — LACOSAMIDE 150 MILLIGRAM(S): 50 TABLET ORAL at 06:35

## 2023-11-07 RX ADMIN — SODIUM CHLORIDE 100 MILLILITER(S): 9 INJECTION INTRAMUSCULAR; INTRAVENOUS; SUBCUTANEOUS at 00:13

## 2023-11-07 RX ADMIN — LACOSAMIDE 150 MILLIGRAM(S): 50 TABLET ORAL at 17:12

## 2023-11-07 RX ADMIN — Medication 2 MILLIGRAM(S): at 00:37

## 2023-11-07 RX ADMIN — OXCARBAZEPINE 300 MILLIGRAM(S): 300 TABLET, FILM COATED ORAL at 06:09

## 2023-11-07 RX ADMIN — HALOPERIDOL DECANOATE 5 MILLIGRAM(S): 100 INJECTION INTRAMUSCULAR at 18:09

## 2023-11-07 RX ADMIN — QUETIAPINE FUMARATE 100 MILLIGRAM(S): 200 TABLET, FILM COATED ORAL at 21:33

## 2023-11-07 RX ADMIN — Medication 200 MILLIGRAM(S): at 21:33

## 2023-11-07 RX ADMIN — BRIVARACETAM 100 MILLIGRAM(S): 25 TABLET, FILM COATED ORAL at 06:19

## 2023-11-07 RX ADMIN — SODIUM CHLORIDE 50 MILLILITER(S): 9 INJECTION, SOLUTION INTRAVENOUS at 11:34

## 2023-11-07 RX ADMIN — Medication 15 MILLIGRAM(S): at 06:09

## 2023-11-07 RX ADMIN — Medication 200 MILLIGRAM(S): at 06:08

## 2023-11-07 NOTE — CHART NOTE - NSCHARTNOTEFT_GEN_A_CORE
MEDICINE NP    Notified by RN patient with temperature fever of 101.7. patient given tylenol IVPB, lactate CBC Blood cx vanco 1750 weight base x 1 dose given. will order Id consult on patient    VITAL SIGNS:  T(C): 38.7 (11-06-23 @ 23:18), Max: 38.7 (11-06-23 @ 23:18)  HR: 118 (11-06-23 @ 23:18) (102 - 119)  BP: 111/51 (11-06-23 @ 23:18) (111/51 - 127/80)  RR: 18 (11-06-23 @ 23:18) (16 - 18)  SpO2: 92% (11-06-23 @ 23:18) (88% - 93%)  Wt(kg): --      LABORATORY:                          15.1   17.87 )-----------( 257      ( 06 Nov 2023 07:00 )             48.1       11-06    141  |  104  |  15  ----------------------------<  97  3.6   |  31  |  1.06    Ca    9.3      06 Nov 2023 07:00            MICROBIOLOGY:           RADIOLOGY:          PHYSICAL EXAM:    Constitutional: AOx3. NAD.    Respiratory: clear lungs bilaterally. No wheezing, rhonchi, or crackles.    Cardiovascular: S1 S2. No murmurs.    Gastrointestinal: BS X4 active. soft. nontender.    Extremities/Vascular: +2 pulses bilaterally. No BLE edema.      ASSESSMENT/PLAN:   HPI:  Chief Complaints: Seizure.    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 Patient's aunt sitting at bedside. Patient is still in some post-ictal state, but he is able to answer some of the simple questions. Reportedly, he had tonic-clonic seizures x5 at home followed by post ictal state. No reported tongue bite or bowel/bladder incontinence, no head strike. His seizure had last for about 19 minutes. Had received versed 5mg IM in route by EMS. He denies headache, vomiting, neck pain or stiffness. Aunt says that his Brivaracetam has been recently changed with Oxcarbazepine 300 mg bid. He has been diagnosed with encephalitis and he is antibiotics and prednisone per his aunt. Patient is compliant on his all medications. Otherwise, he denies fever, chills, chest pain, sob, N/V, abdominal pain, diarrhea or dysuria.  He does vape but denies alcohol or substance abuse.  Sig labs: WBC 12.76k, N 78%, BUN 12, Cr 1.35, ALT 87, Valproic level 85 (normal range ) and Oxcarbazepine level pending.  CT head negative.    His current home medications per his aunt:  Depakote 750 mg bid  Vimpat 150 mg bid  Oxcarbazepine 300 mg bid  Clarithromycin 500 mg bid  Amoxicillin 1000 mg bid  Bactrim-DS (800-160) 1 tab on Mon/Wed/Friday (3 days in a week).  Prednisone 15 mg po daily.  Pantoprazole 40 mg daily.  Vit B12 1000 IU on Mon/Wed/Friday (3 days in a week).       (31 Oct 2023 22:43)        1)Fever  -Tylenol and cooling measures prn for pyrexia  -BC x2, Lactate  -IVF  -CBC  -ABX Vanco empiric dosing x1 dose  -F/U primary team in AM

## 2023-11-07 NOTE — PROGRESS NOTE ADULT - SUBJECTIVE AND OBJECTIVE BOX
Patient is a 28y old  Male who presents with a chief complaint of Breakthrough seizure. (06 Nov 2023 12:22)      INTERVAL HPI/OVERNIGHT EVENTS: Febrile overnight.     MEDICATIONS  (STANDING):  benzonatate 200 milliGRAM(s) Oral three times a day  brivaracetam 100 milliGRAM(s) Oral two times a day  cyanocobalamin 1000 MICROGram(s) Oral <User Schedule>  dextrose 5% + lactated ringers. 1000 milliLiter(s) (50 mL/Hr) IV Continuous <Continuous>  divalproex  milliGRAM(s) Oral two times a day  lacosamide 150 milliGRAM(s) Oral two times a day  OXcarbazepine 300 milliGRAM(s) Oral two times a day  pantoprazole    Tablet 40 milliGRAM(s) Oral before breakfast  predniSONE   Tablet 15 milliGRAM(s) Oral daily  QUEtiapine 100 milliGRAM(s) Oral at bedtime    MEDICATIONS  (PRN):  acetaminophen     Tablet .. 650 milliGRAM(s) Oral every 6 hours PRN Temp greater or equal to 38C (100.4F), Mild Pain (1 - 3)  haloperidol    Injectable 5 milliGRAM(s) IntraMuscular every 6 hours PRN Severe Agitation  LORazepam   Injectable 2 milliGRAM(s) IV Push every 4 hours PRN Agitation  LORazepam   Injectable 2 milliGRAM(s) IntraMuscular every 2 hours PRN Seizure like activity  melatonin 3 milliGRAM(s) Oral at bedtime PRN Insomnia      Allergies    No Known Allergies    Intolerances        REVIEW OF SYSTEMS:  Unobtainable due to altered mental status    Vital Signs Last 24 Hrs  T(C): 37 (07 Nov 2023 10:30), Max: 38.7 (06 Nov 2023 23:18)  T(F): 98.6 (07 Nov 2023 10:30), Max: 101.7 (06 Nov 2023 23:18)  HR: 88 (07 Nov 2023 10:30) (85 - 118)  BP: 124/86 (07 Nov 2023 10:30) (111/51 - 124/86)  BP(mean): --  RR: 18 (07 Nov 2023 10:30) (18 - 18)  SpO2: 98% (07 Nov 2023 10:30) (92% - 98%)    Parameters below as of 07 Nov 2023 10:30  Patient On (Oxygen Delivery Method): nasal cannula  O2 Flow (L/min): 2      PHYSICAL EXAM:    HEAD:  Atraumatic, Normocephalic  EYES: EOMI, PERRLA, conjunctiva and sclera clear      LABS:                        13.9   14.75 )-----------( 232      ( 07 Nov 2023 06:10 )             43.9     11-06    141  |  104  |  15  ----------------------------<  97  3.6   |  31  |  1.06    Ca    9.3      06 Nov 2023 07:00        Urinalysis Basic - ( 06 Nov 2023 07:00 )    Color: x / Appearance: x / SG: x / pH: x  Gluc: 97 mg/dL / Ketone: x  / Bili: x / Urobili: x   Blood: x / Protein: x / Nitrite: x   Leuk Esterase: x / RBC: x / WBC x   Sq Epi: x / Non Sq Epi: x / Bacteria: x

## 2023-11-07 NOTE — PROGRESS NOTE ADULT - ASSESSMENT
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 Currently post-ictal and agitated and pending transfer to [facility] in [place], [place].       Problem/Plan - 1:  ·  Problem: Seizure.   ·  Plan: Seizure d/o with breakthrough seizures due to prior encephalitis  CT head on admission unremarkable  Unable to perform EEG due to intermittent agitated  Unable to perform MRI, but given transfer can be deferred  Seizure precautions  Neurology consult appreciated  Continue with: Briviact 100mg BID, Depakote 750mg BID, Vimpat 150mg BID, and Trileptal 300mg BID.     Problem/Plan - 2:  ·  Problem: Fever.   ·  Plan: Developed low grade on 11/4   May be central fever?  UA, CXR, COVID -ve, blood culture pending     Problem/Plan - 3:  ·  Problem: Agitation.   ·  Plan: Agitation in setting of post-ictal state   Haldol 5 mg IM prn or Ativan 2 mg IM (can stack if needed)  Seroquel qhs.     Problem/Plan - 4:  ·  Problem: Infectious encephalitis.   ·  Plan: Continue with Prednisone 15 mg daily  Off PJP PPx given <20 mg.     Problem/Plan - 5:  ·  Problem: LUIS CARLOS (acute kidney injury).   ·  Plan: RESOLVED and likely pre-renal.     Problem/Plan - 6:  ·  Problem: Elevated LFTs.   ·  Plan: Mildly elevated LFT's possibly medication induced   Monitor.     Problem/Plan - 7:  ·  Problem: Need for prophylactic measure.   ·  Plan: Defer due to intermittent agitation, risk of DVT is low    Patient likely to be transported on 11/8 to [facility].        Mehran Arizmendi MD

## 2023-11-08 LAB
ANION GAP SERPL CALC-SCNC: 7 MMOL/L — SIGNIFICANT CHANGE UP (ref 5–17)
ANION GAP SERPL CALC-SCNC: 7 MMOL/L — SIGNIFICANT CHANGE UP (ref 5–17)
BUN SERPL-MCNC: 10 MG/DL — SIGNIFICANT CHANGE UP (ref 7–23)
BUN SERPL-MCNC: 10 MG/DL — SIGNIFICANT CHANGE UP (ref 7–23)
CALCIUM SERPL-MCNC: 8.9 MG/DL — SIGNIFICANT CHANGE UP (ref 8.5–10.1)
CALCIUM SERPL-MCNC: 8.9 MG/DL — SIGNIFICANT CHANGE UP (ref 8.5–10.1)
CHLORIDE SERPL-SCNC: 106 MMOL/L — SIGNIFICANT CHANGE UP (ref 96–108)
CHLORIDE SERPL-SCNC: 106 MMOL/L — SIGNIFICANT CHANGE UP (ref 96–108)
CO2 SERPL-SCNC: 26 MMOL/L — SIGNIFICANT CHANGE UP (ref 22–31)
CO2 SERPL-SCNC: 26 MMOL/L — SIGNIFICANT CHANGE UP (ref 22–31)
CREAT SERPL-MCNC: 0.87 MG/DL — SIGNIFICANT CHANGE UP (ref 0.5–1.3)
CREAT SERPL-MCNC: 0.87 MG/DL — SIGNIFICANT CHANGE UP (ref 0.5–1.3)
EGFR: 119 ML/MIN/1.73M2 — SIGNIFICANT CHANGE UP
EGFR: 119 ML/MIN/1.73M2 — SIGNIFICANT CHANGE UP
GLUCOSE SERPL-MCNC: 109 MG/DL — HIGH (ref 70–99)
GLUCOSE SERPL-MCNC: 109 MG/DL — HIGH (ref 70–99)
HCT VFR BLD CALC: 40.6 % — SIGNIFICANT CHANGE UP (ref 39–50)
HCT VFR BLD CALC: 40.6 % — SIGNIFICANT CHANGE UP (ref 39–50)
HGB BLD-MCNC: 13.1 G/DL — SIGNIFICANT CHANGE UP (ref 13–17)
HGB BLD-MCNC: 13.1 G/DL — SIGNIFICANT CHANGE UP (ref 13–17)
LACTATE SERPL-SCNC: 2.5 MMOL/L — HIGH (ref 0.7–2)
LACTATE SERPL-SCNC: 2.5 MMOL/L — HIGH (ref 0.7–2)
MAGNESIUM SERPL-MCNC: 2 MG/DL — SIGNIFICANT CHANGE UP (ref 1.6–2.6)
MAGNESIUM SERPL-MCNC: 2 MG/DL — SIGNIFICANT CHANGE UP (ref 1.6–2.6)
MCHC RBC-ENTMCNC: 26.9 PG — LOW (ref 27–34)
MCHC RBC-ENTMCNC: 26.9 PG — LOW (ref 27–34)
MCHC RBC-ENTMCNC: 32.3 G/DL — SIGNIFICANT CHANGE UP (ref 32–36)
MCHC RBC-ENTMCNC: 32.3 G/DL — SIGNIFICANT CHANGE UP (ref 32–36)
MCV RBC AUTO: 83.4 FL — SIGNIFICANT CHANGE UP (ref 80–100)
MCV RBC AUTO: 83.4 FL — SIGNIFICANT CHANGE UP (ref 80–100)
NRBC # BLD: 0 /100 WBCS — SIGNIFICANT CHANGE UP (ref 0–0)
NRBC # BLD: 0 /100 WBCS — SIGNIFICANT CHANGE UP (ref 0–0)
PHOSPHATE SERPL-MCNC: 2.5 MG/DL — SIGNIFICANT CHANGE UP (ref 2.5–4.5)
PHOSPHATE SERPL-MCNC: 2.5 MG/DL — SIGNIFICANT CHANGE UP (ref 2.5–4.5)
PLATELET # BLD AUTO: 227 K/UL — SIGNIFICANT CHANGE UP (ref 150–400)
PLATELET # BLD AUTO: 227 K/UL — SIGNIFICANT CHANGE UP (ref 150–400)
POTASSIUM SERPL-MCNC: 3.2 MMOL/L — LOW (ref 3.5–5.3)
POTASSIUM SERPL-MCNC: 3.2 MMOL/L — LOW (ref 3.5–5.3)
POTASSIUM SERPL-SCNC: 3.2 MMOL/L — LOW (ref 3.5–5.3)
POTASSIUM SERPL-SCNC: 3.2 MMOL/L — LOW (ref 3.5–5.3)
PROCALCITONIN SERPL-MCNC: 0.84 NG/ML — HIGH (ref 0.02–0.1)
PROCALCITONIN SERPL-MCNC: 0.84 NG/ML — HIGH (ref 0.02–0.1)
RAPID RVP RESULT: SIGNIFICANT CHANGE UP
RAPID RVP RESULT: SIGNIFICANT CHANGE UP
RBC # BLD: 4.87 M/UL — SIGNIFICANT CHANGE UP (ref 4.2–5.8)
RBC # BLD: 4.87 M/UL — SIGNIFICANT CHANGE UP (ref 4.2–5.8)
RBC # FLD: 16.3 % — HIGH (ref 10.3–14.5)
RBC # FLD: 16.3 % — HIGH (ref 10.3–14.5)
SARS-COV-2 RNA SPEC QL NAA+PROBE: SIGNIFICANT CHANGE UP
SARS-COV-2 RNA SPEC QL NAA+PROBE: SIGNIFICANT CHANGE UP
SODIUM SERPL-SCNC: 139 MMOL/L — SIGNIFICANT CHANGE UP (ref 135–145)
SODIUM SERPL-SCNC: 139 MMOL/L — SIGNIFICANT CHANGE UP (ref 135–145)
WBC # BLD: 11.51 K/UL — HIGH (ref 3.8–10.5)
WBC # BLD: 11.51 K/UL — HIGH (ref 3.8–10.5)
WBC # FLD AUTO: 11.51 K/UL — HIGH (ref 3.8–10.5)
WBC # FLD AUTO: 11.51 K/UL — HIGH (ref 3.8–10.5)

## 2023-11-08 PROCEDURE — 71045 X-RAY EXAM CHEST 1 VIEW: CPT | Mod: 26

## 2023-11-08 PROCEDURE — 99232 SBSQ HOSP IP/OBS MODERATE 35: CPT

## 2023-11-08 RX ORDER — POTASSIUM CHLORIDE 20 MEQ
40 PACKET (EA) ORAL EVERY 4 HOURS
Refills: 0 | Status: COMPLETED | OUTPATIENT
Start: 2023-11-08 | End: 2023-11-08

## 2023-11-08 RX ORDER — ACETAMINOPHEN 500 MG
1000 TABLET ORAL ONCE
Refills: 0 | Status: COMPLETED | OUTPATIENT
Start: 2023-11-08 | End: 2023-11-08

## 2023-11-08 RX ADMIN — Medication 200 MILLIGRAM(S): at 14:07

## 2023-11-08 RX ADMIN — HALOPERIDOL DECANOATE 5 MILLIGRAM(S): 100 INJECTION INTRAMUSCULAR at 16:20

## 2023-11-08 RX ADMIN — QUETIAPINE FUMARATE 100 MILLIGRAM(S): 200 TABLET, FILM COATED ORAL at 22:58

## 2023-11-08 RX ADMIN — Medication 15 MILLIGRAM(S): at 05:41

## 2023-11-08 RX ADMIN — Medication 200 MILLIGRAM(S): at 22:58

## 2023-11-08 RX ADMIN — Medication 650 MILLIGRAM(S): at 16:00

## 2023-11-08 RX ADMIN — BRIVARACETAM 100 MILLIGRAM(S): 25 TABLET, FILM COATED ORAL at 17:16

## 2023-11-08 RX ADMIN — Medication 40 MILLIEQUIVALENT(S): at 14:07

## 2023-11-08 RX ADMIN — PANTOPRAZOLE SODIUM 40 MILLIGRAM(S): 20 TABLET, DELAYED RELEASE ORAL at 06:09

## 2023-11-08 RX ADMIN — LACOSAMIDE 150 MILLIGRAM(S): 50 TABLET ORAL at 05:44

## 2023-11-08 RX ADMIN — Medication 650 MILLIGRAM(S): at 15:14

## 2023-11-08 RX ADMIN — Medication 200 MILLIGRAM(S): at 05:41

## 2023-11-08 RX ADMIN — DIVALPROEX SODIUM 750 MILLIGRAM(S): 500 TABLET, DELAYED RELEASE ORAL at 05:41

## 2023-11-08 RX ADMIN — BRIVARACETAM 100 MILLIGRAM(S): 25 TABLET, FILM COATED ORAL at 06:09

## 2023-11-08 RX ADMIN — DIVALPROEX SODIUM 750 MILLIGRAM(S): 500 TABLET, DELAYED RELEASE ORAL at 17:16

## 2023-11-08 RX ADMIN — Medication 40 MILLIEQUIVALENT(S): at 17:16

## 2023-11-08 RX ADMIN — Medication 1000 MILLIGRAM(S): at 05:46

## 2023-11-08 RX ADMIN — PREGABALIN 1000 MICROGRAM(S): 225 CAPSULE ORAL at 12:30

## 2023-11-08 RX ADMIN — Medication 3 MILLIGRAM(S): at 23:01

## 2023-11-08 RX ADMIN — OXCARBAZEPINE 300 MILLIGRAM(S): 300 TABLET, FILM COATED ORAL at 05:41

## 2023-11-08 RX ADMIN — Medication 400 MILLIGRAM(S): at 04:55

## 2023-11-08 RX ADMIN — Medication 650 MILLIGRAM(S): at 23:01

## 2023-11-08 RX ADMIN — LACOSAMIDE 150 MILLIGRAM(S): 50 TABLET ORAL at 17:16

## 2023-11-08 RX ADMIN — OXCARBAZEPINE 300 MILLIGRAM(S): 300 TABLET, FILM COATED ORAL at 17:16

## 2023-11-08 NOTE — PROGRESS NOTE ADULT - SUBJECTIVE AND OBJECTIVE BOX
Patient is a 28y old  Male who presents with a chief complaint of Breakthrough seizure. (07 Nov 2023 12:29)      INTERVAL HPI/OVERNIGHT EVENTS:  Pt was seen and examined, no acute events.      MEDICATIONS  (STANDING):  benzonatate 200 milliGRAM(s) Oral three times a day  brivaracetam 100 milliGRAM(s) Oral two times a day  cyanocobalamin 1000 MICROGram(s) Oral <User Schedule>  dextrose 5% + lactated ringers. 1000 milliLiter(s) (50 mL/Hr) IV Continuous <Continuous>  divalproex  milliGRAM(s) Oral two times a day  lacosamide 150 milliGRAM(s) Oral two times a day  OXcarbazepine 300 milliGRAM(s) Oral two times a day  pantoprazole    Tablet 40 milliGRAM(s) Oral before breakfast  potassium chloride   Powder 40 milliEquivalent(s) Oral every 4 hours  predniSONE   Tablet 15 milliGRAM(s) Oral daily  QUEtiapine 100 milliGRAM(s) Oral at bedtime    MEDICATIONS  (PRN):  acetaminophen     Tablet .. 650 milliGRAM(s) Oral every 6 hours PRN Temp greater or equal to 38C (100.4F), Mild Pain (1 - 3)  haloperidol    Injectable 5 milliGRAM(s) IntraMuscular every 6 hours PRN Severe Agitation  LORazepam   Injectable 2 milliGRAM(s) IntraMuscular every 2 hours PRN Seizure like activity  LORazepam   Injectable 2 milliGRAM(s) IV Push every 4 hours PRN Agitation  melatonin 3 milliGRAM(s) Oral at bedtime PRN Insomnia      Allergies  No Known Allergies          Vital Signs Last 24 Hrs  T(C): 37.1 (08 Nov 2023 10:49), Max: 39.3 (08 Nov 2023 04:32)  T(F): 98.8 (08 Nov 2023 10:49), Max: 102.7 (08 Nov 2023 04:32)  HR: 76 (08 Nov 2023 10:49) (76 - 116)  BP: 105/71 (08 Nov 2023 10:49) (105/69 - 122/74)  BP(mean): --  RR: 18 (08 Nov 2023 10:49) (17 - 18)  SpO2: 99% (08 Nov 2023 10:49) (95% - 99%)    Parameters below as of 08 Nov 2023 04:32  Patient On (Oxygen Delivery Method): room air        PHYSICAL EXAM:  GENERAL: Awake, alert  HEAD:  Clear  EYES: PERRLA  ENMT: Mouth moist   NECK: Supple  NERVOUS SYSTEM:  Awake, alert  CHEST/LUNG: Clear  HEART: RRR, S1, S2  ABDOMEN: Soft, non tender  EXTREMITIES:  no edema BL LE  SKIN: No rash        LABS:                        13.1   11.51 )-----------( 227      ( 08 Nov 2023 06:35 )             40.6     11-08    139  |  106  |  10  ----------------------------<  109<H>  3.2<L>   |  26  |  0.87    Ca    8.9      08 Nov 2023 06:35  Phos  2.5     11-08  Mg     2.0     11-08    TPro  7.1  /  Alb  2.7<L>  /  TBili  0.4  /  DBili  x   /  AST  14<L>  /  ALT  40  /  AlkPhos  61  11-07      Urinalysis Basic - ( 08 Nov 2023 06:35 )    Color: x / Appearance: x / SG: x / pH: x  Gluc: 109 mg/dL / Ketone: x  / Bili: x / Urobili: x   Blood: x / Protein: x / Nitrite: x   Leuk Esterase: x / RBC: x / WBC x   Sq Epi: x / Non Sq Epi: x / Bacteria: x      CAPILLARY BLOOD GLUCOSE          Culture - Blood (collected 06 Nov 2023 23:40)  Source: .Blood Blood-Peripheral  Preliminary Report (08 Nov 2023 09:01):    No growth at 24 hours    Culture - Blood (collected 06 Nov 2023 23:20)  Source: .Blood Blood-Peripheral  Preliminary Report (08 Nov 2023 09:01):    No growth at 24 hours    Culture - Urine (collected 05 Nov 2023 14:40)  Source: Clean Catch Clean Catch (Midstream)  Final Report (06 Nov 2023 16:13):    <10,000 CFU/mL Normal Urogenital Erica      RADIOLOGY & ADDITIONAL TESTS:    Imaging Personally Reviewed:  [ ] YES  [ ] NO    Consultant(s) Notes Reviewed:  [ ] YES  [ ] NO    Care Discussed with Consultants/Other Providers [ ] YES  [ ] NO

## 2023-11-08 NOTE — PROGRESS NOTE ADULT - ASSESSMENT
28 year old male with significant PMH of Seizure (on 3 AED) and encephalitis (autoimmune?) was BIBA with seizure episode. Currently post-ictal and agitated and pending transfer to [facility] in [place], [place].      Seizure.   Seizure d/o with breakthrough seizures due to prior encephalitis  CT head on admission unremarkable  Unable to perform EEG due to intermittent agitated  Unable to perform MRI, but given transfer can be deferred  Seizure precautions  Neurology consult appreciated  Continue with: Briviact 100mg BID, Depakote 750mg BID, Vimpat 150mg BID, and Trileptal 300mg BID.    Fever.   Work up previously been neg  Now febrile again  WBC improved today  Follow up repeat blood clx, check RVP    Agitation.    Agitation in setting of post-ictal state   Haldol 5 mg IM prn or Ativan 2 mg IM   Seroquel qhs.    H/O Infectious encephalitis.   Continue with Prednisone 15 mg daily  Off PJP PPx given <20 mg.    LUIS CARLOS (acute kidney injury).   RESOLVED and likely pre-renal.    Elevated LFTs.   Mildly elevated LFT's possibly medication induced   Monitor.    Need for prophylactic measure.   Defer due to intermittent agitation, risk of DVT is low    Plan for transfer to [facility].        Mehran Arizmendi MD

## 2023-11-09 LAB
ALBUMIN SERPL ELPH-MCNC: 2.6 G/DL — LOW (ref 3.3–5)
ALBUMIN SERPL ELPH-MCNC: 2.6 G/DL — LOW (ref 3.3–5)
ALP SERPL-CCNC: 61 U/L — SIGNIFICANT CHANGE UP (ref 40–120)
ALP SERPL-CCNC: 61 U/L — SIGNIFICANT CHANGE UP (ref 40–120)
ALT FLD-CCNC: 36 U/L — SIGNIFICANT CHANGE UP (ref 12–78)
ALT FLD-CCNC: 36 U/L — SIGNIFICANT CHANGE UP (ref 12–78)
ANION GAP SERPL CALC-SCNC: 6 MMOL/L — SIGNIFICANT CHANGE UP (ref 5–17)
ANION GAP SERPL CALC-SCNC: 6 MMOL/L — SIGNIFICANT CHANGE UP (ref 5–17)
AST SERPL-CCNC: 16 U/L — SIGNIFICANT CHANGE UP (ref 15–37)
AST SERPL-CCNC: 16 U/L — SIGNIFICANT CHANGE UP (ref 15–37)
BILIRUB SERPL-MCNC: 0.4 MG/DL — SIGNIFICANT CHANGE UP (ref 0.2–1.2)
BILIRUB SERPL-MCNC: 0.4 MG/DL — SIGNIFICANT CHANGE UP (ref 0.2–1.2)
BUN SERPL-MCNC: 11 MG/DL — SIGNIFICANT CHANGE UP (ref 7–23)
BUN SERPL-MCNC: 11 MG/DL — SIGNIFICANT CHANGE UP (ref 7–23)
CALCIUM SERPL-MCNC: 9 MG/DL — SIGNIFICANT CHANGE UP (ref 8.5–10.1)
CALCIUM SERPL-MCNC: 9 MG/DL — SIGNIFICANT CHANGE UP (ref 8.5–10.1)
CHLORIDE SERPL-SCNC: 107 MMOL/L — SIGNIFICANT CHANGE UP (ref 96–108)
CHLORIDE SERPL-SCNC: 107 MMOL/L — SIGNIFICANT CHANGE UP (ref 96–108)
CO2 SERPL-SCNC: 29 MMOL/L — SIGNIFICANT CHANGE UP (ref 22–31)
CO2 SERPL-SCNC: 29 MMOL/L — SIGNIFICANT CHANGE UP (ref 22–31)
CREAT SERPL-MCNC: 0.83 MG/DL — SIGNIFICANT CHANGE UP (ref 0.5–1.3)
CREAT SERPL-MCNC: 0.83 MG/DL — SIGNIFICANT CHANGE UP (ref 0.5–1.3)
EGFR: 121 ML/MIN/1.73M2 — SIGNIFICANT CHANGE UP
EGFR: 121 ML/MIN/1.73M2 — SIGNIFICANT CHANGE UP
GLUCOSE SERPL-MCNC: 109 MG/DL — HIGH (ref 70–99)
GLUCOSE SERPL-MCNC: 109 MG/DL — HIGH (ref 70–99)
HCT VFR BLD CALC: 41.9 % — SIGNIFICANT CHANGE UP (ref 39–50)
HCT VFR BLD CALC: 41.9 % — SIGNIFICANT CHANGE UP (ref 39–50)
HGB BLD-MCNC: 12.9 G/DL — LOW (ref 13–17)
HGB BLD-MCNC: 12.9 G/DL — LOW (ref 13–17)
M PNEUMO IGM SER-ACNC: 0.47 INDEX — SIGNIFICANT CHANGE UP (ref 0–0.9)
M PNEUMO IGM SER-ACNC: 0.47 INDEX — SIGNIFICANT CHANGE UP (ref 0–0.9)
MCHC RBC-ENTMCNC: 26.3 PG — LOW (ref 27–34)
MCHC RBC-ENTMCNC: 26.3 PG — LOW (ref 27–34)
MCHC RBC-ENTMCNC: 30.8 G/DL — LOW (ref 32–36)
MCHC RBC-ENTMCNC: 30.8 G/DL — LOW (ref 32–36)
MCV RBC AUTO: 85.5 FL — SIGNIFICANT CHANGE UP (ref 80–100)
MCV RBC AUTO: 85.5 FL — SIGNIFICANT CHANGE UP (ref 80–100)
MYCOPLASMA AG SPEC QL: NEGATIVE — SIGNIFICANT CHANGE UP
MYCOPLASMA AG SPEC QL: NEGATIVE — SIGNIFICANT CHANGE UP
NRBC # BLD: 0 /100 WBCS — SIGNIFICANT CHANGE UP (ref 0–0)
NRBC # BLD: 0 /100 WBCS — SIGNIFICANT CHANGE UP (ref 0–0)
PLATELET # BLD AUTO: 261 K/UL — SIGNIFICANT CHANGE UP (ref 150–400)
PLATELET # BLD AUTO: 261 K/UL — SIGNIFICANT CHANGE UP (ref 150–400)
POTASSIUM SERPL-MCNC: 3.6 MMOL/L — SIGNIFICANT CHANGE UP (ref 3.5–5.3)
POTASSIUM SERPL-MCNC: 3.6 MMOL/L — SIGNIFICANT CHANGE UP (ref 3.5–5.3)
POTASSIUM SERPL-SCNC: 3.6 MMOL/L — SIGNIFICANT CHANGE UP (ref 3.5–5.3)
POTASSIUM SERPL-SCNC: 3.6 MMOL/L — SIGNIFICANT CHANGE UP (ref 3.5–5.3)
PROT SERPL-MCNC: 6.7 GM/DL — SIGNIFICANT CHANGE UP (ref 6–8.3)
PROT SERPL-MCNC: 6.7 GM/DL — SIGNIFICANT CHANGE UP (ref 6–8.3)
RBC # BLD: 4.9 M/UL — SIGNIFICANT CHANGE UP (ref 4.2–5.8)
RBC # BLD: 4.9 M/UL — SIGNIFICANT CHANGE UP (ref 4.2–5.8)
RBC # FLD: 16.5 % — HIGH (ref 10.3–14.5)
RBC # FLD: 16.5 % — HIGH (ref 10.3–14.5)
SODIUM SERPL-SCNC: 142 MMOL/L — SIGNIFICANT CHANGE UP (ref 135–145)
SODIUM SERPL-SCNC: 142 MMOL/L — SIGNIFICANT CHANGE UP (ref 135–145)
WBC # BLD: 12.95 K/UL — HIGH (ref 3.8–10.5)
WBC # BLD: 12.95 K/UL — HIGH (ref 3.8–10.5)
WBC # FLD AUTO: 12.95 K/UL — HIGH (ref 3.8–10.5)
WBC # FLD AUTO: 12.95 K/UL — HIGH (ref 3.8–10.5)

## 2023-11-09 PROCEDURE — 99232 SBSQ HOSP IP/OBS MODERATE 35: CPT

## 2023-11-09 RX ADMIN — OXCARBAZEPINE 300 MILLIGRAM(S): 300 TABLET, FILM COATED ORAL at 05:39

## 2023-11-09 RX ADMIN — Medication 200 MILLIGRAM(S): at 21:36

## 2023-11-09 RX ADMIN — DIVALPROEX SODIUM 750 MILLIGRAM(S): 500 TABLET, DELAYED RELEASE ORAL at 05:38

## 2023-11-09 RX ADMIN — BRIVARACETAM 100 MILLIGRAM(S): 25 TABLET, FILM COATED ORAL at 05:45

## 2023-11-09 RX ADMIN — BRIVARACETAM 100 MILLIGRAM(S): 25 TABLET, FILM COATED ORAL at 17:49

## 2023-11-09 RX ADMIN — OXCARBAZEPINE 300 MILLIGRAM(S): 300 TABLET, FILM COATED ORAL at 17:49

## 2023-11-09 RX ADMIN — Medication 15 MILLIGRAM(S): at 05:43

## 2023-11-09 RX ADMIN — DIVALPROEX SODIUM 750 MILLIGRAM(S): 500 TABLET, DELAYED RELEASE ORAL at 17:49

## 2023-11-09 RX ADMIN — LACOSAMIDE 150 MILLIGRAM(S): 50 TABLET ORAL at 17:49

## 2023-11-09 RX ADMIN — Medication 3 MILLIGRAM(S): at 20:33

## 2023-11-09 RX ADMIN — QUETIAPINE FUMARATE 100 MILLIGRAM(S): 200 TABLET, FILM COATED ORAL at 21:37

## 2023-11-09 RX ADMIN — PANTOPRAZOLE SODIUM 40 MILLIGRAM(S): 20 TABLET, DELAYED RELEASE ORAL at 05:41

## 2023-11-09 RX ADMIN — Medication 200 MILLIGRAM(S): at 05:39

## 2023-11-09 RX ADMIN — LACOSAMIDE 150 MILLIGRAM(S): 50 TABLET ORAL at 05:40

## 2023-11-09 NOTE — SWALLOW BEDSIDE ASSESSMENT ADULT - SWALLOW EVAL: SECRETION MANAGEMENT
SLP cued pt to clear cough clear and expectorate/baseline cough/expectorate independently/wet upper airway/breath sounds

## 2023-11-09 NOTE — SWALLOW BEDSIDE ASSESSMENT ADULT - SLP PERTINENT HISTORY OF CURRENT PROBLEM
Breakthrough seizure c/o seizure pt admitted to telemetry for Breakthrough seizure, H/o recent, Encephalitis Mild LUIS CARLOS

## 2023-11-09 NOTE — SWALLOW BEDSIDE ASSESSMENT ADULT - COMMENTS
CXR 11/7/2023 Impression: Atelectasis versus infiltrate left base. There is an elevated right diaphragm. There is increasing opacity right lower and middle lobe representing pleural effusion, consolidation and/or infiltrate.    CT head no contrast 10/31/2023 IMPRESSION: No acute intracranial bleeding.

## 2023-11-09 NOTE — SWALLOW BEDSIDE ASSESSMENT ADULT - SWALLOW EVAL: DIAGNOSIS
oropharyngeal swallow marked by adequate labial seal/oral containment, functional mastication bolus manipulation /formation for solid and AP transport, +initiation of pharyngeal and hyolaryngeal elevation to palpation. no overt clinical signs of airway penetration

## 2023-11-09 NOTE — PROGRESS NOTE ADULT - SUBJECTIVE AND OBJECTIVE BOX
Patient is a 28y old  Male who presents with a chief complaint of Breakthrough seizure. (08 Nov 2023 13:40)      INTERVAL HPI/OVERNIGHT EVENTS:  Pt was seen and examined, no acute events.      MEDICATIONS  (STANDING):  benzonatate 200 milliGRAM(s) Oral three times a day  brivaracetam 100 milliGRAM(s) Oral two times a day  cyanocobalamin 1000 MICROGram(s) Oral <User Schedule>  divalproex  milliGRAM(s) Oral two times a day  lacosamide 150 milliGRAM(s) Oral two times a day  OXcarbazepine 300 milliGRAM(s) Oral two times a day  pantoprazole    Tablet 40 milliGRAM(s) Oral before breakfast  predniSONE   Tablet 15 milliGRAM(s) Oral daily  QUEtiapine 100 milliGRAM(s) Oral at bedtime    MEDICATIONS  (PRN):  acetaminophen     Tablet .. 650 milliGRAM(s) Oral every 6 hours PRN Temp greater or equal to 38C (100.4F), Mild Pain (1 - 3)  haloperidol    Injectable 5 milliGRAM(s) IntraMuscular every 6 hours PRN Severe Agitation  LORazepam   Injectable 2 milliGRAM(s) IV Push every 4 hours PRN Agitation  LORazepam   Injectable 2 milliGRAM(s) IntraMuscular every 2 hours PRN Seizure like activity  melatonin 3 milliGRAM(s) Oral at bedtime PRN Insomnia      Allergies    No Known Allergies    Intolerances          Vital Signs Last 24 Hrs  T(C): 37.3 (09 Nov 2023 16:34), Max: 37.3 (09 Nov 2023 16:34)  T(F): 99.1 (09 Nov 2023 16:34), Max: 99.1 (09 Nov 2023 16:34)  HR: 82 (09 Nov 2023 16:34) (76 - 103)  BP: 109/71 (09 Nov 2023 16:34) (104/62 - 116/72)  BP(mean): 84 (09 Nov 2023 16:34) (84 - 84)  RR: 19 (09 Nov 2023 16:34) (17 - 19)  SpO2: 95% (09 Nov 2023 16:34) (93% - 98%)    Parameters below as of 09 Nov 2023 16:34  Patient On (Oxygen Delivery Method): room air        PHYSICAL EXAM:  GENERAL: Awake, alert  HEAD:  Clear  EYES: PERRLA  ENMT: Mouth moist   NECK: Supple  NERVOUS SYSTEM:  Awake, alert  CHEST/LUNG: Clear  HEART: RRR, S1, S2  ABDOMEN: Soft, non tender  EXTREMITIES:  no edema BL LE  SKIN: No rash        LABS:                        12.9   12.95 )-----------( 261      ( 09 Nov 2023 08:18 )             41.9     11-09    142  |  107  |  11  ----------------------------<  109<H>  3.6   |  29  |  0.83    Ca    9.0      09 Nov 2023 08:18  Phos  2.5     11-08  Mg     2.0     11-08    TPro  6.7  /  Alb  2.6<L>  /  TBili  0.4  /  DBili  x   /  AST  16  /  ALT  36  /  AlkPhos  61  11-09      Urinalysis Basic - ( 09 Nov 2023 08:18 )    Color: x / Appearance: x / SG: x / pH: x  Gluc: 109 mg/dL / Ketone: x  / Bili: x / Urobili: x   Blood: x / Protein: x / Nitrite: x   Leuk Esterase: x / RBC: x / WBC x   Sq Epi: x / Non Sq Epi: x / Bacteria: x      CAPILLARY BLOOD GLUCOSE          Culture - Blood (collected 08 Nov 2023 06:35)  Source: .Blood Blood-Peripheral  Preliminary Report (09 Nov 2023 11:02):    No growth at 24 hours    Culture - Blood (collected 06 Nov 2023 23:40)  Source: .Blood Blood-Peripheral  Preliminary Report (09 Nov 2023 09:01):    No growth at 48 Hours    Culture - Blood (collected 06 Nov 2023 23:20)  Source: .Blood Blood-Peripheral  Preliminary Report (09 Nov 2023 09:01):    No growth at 48 Hours    Culture - Urine (collected 05 Nov 2023 14:40)  Source: Clean Catch Clean Catch (Midstream)  Final Report (06 Nov 2023 16:13):    <10,000 CFU/mL Normal Urogenital Erica      RADIOLOGY & ADDITIONAL TESTS:    Imaging Personally Reviewed:  [ ] YES  [ ] NO    Consultant(s) Notes Reviewed:  [ ] YES  [ ] NO    Care Discussed with Consultants/Other Providers [ ] YES  [ ] NO

## 2023-11-09 NOTE — PROGRESS NOTE ADULT - ASSESSMENT
28 year old male with significant PMH of Seizure (on 3 AED) and encephalitis (autoimmune?) was BIBA with seizure episode. Currently post-ictal and agitated and pending transfer to [facility] in [place], [place].      Seizure.   Seizure d/o with breakthrough seizures due to prior encephalitis  CT head on admission unremarkable  Unable to perform EEG due to intermittent agitated  Unable to perform MRI, but given transfer can be deferred  Seizure precautions  Neurology consult appreciated  Continue with: Briviact 100mg BID, Depakote 750mg BID, Vimpat 150mg BID, and Trileptal 300mg BID.    Fever.   Afebrile today  WBC improved today  Follow up repeat blood clx, neg so far, neg RVP    Agitation.    Agitation in setting of post-ictal state   Haldol 5 mg IM prn or Ativan 2 mg IM   Seroquel qhs.    H/O Infectious encephalitis.   Continue with Prednisone 15 mg daily  Off PJP PPx given <20 mg.    LUIS CARLOS (acute kidney injury).   RESOLVED and likely pre-renal.    Elevated LFTs.   Mildly elevated LFT's possibly medication induced   Monitor.    Need for prophylactic measure.   Defer due to intermittent agitation, risk of DVT is low    Plan for transfer to [facility].        Mehran Arizmendi MD

## 2023-11-09 NOTE — PROGRESS NOTE ADULT - PROVIDER SPECIALTY LIST ADULT
Hospitalist
Hospitalist
Neurology
Hospitalist
Neurology
Hospitalist
Internal Medicine
Internal Medicine

## 2023-11-09 NOTE — SWALLOW BEDSIDE ASSESSMENT ADULT - SLP GENERAL OBSERVATIONS
pt seen bedside sitting upright in 2C telemetry on RA and alert. pt responded to questions for assessment, verbalized wants and he was able to follow one step directions. speech intelligibility subjectively judged to be WFL, noted raspy hoarse vocal quality.

## 2023-11-09 NOTE — PROGRESS NOTE ADULT - REASON FOR ADMISSION
Breakthrough seizure.

## 2023-11-09 NOTE — SWALLOW BEDSIDE ASSESSMENT ADULT - H & P REVIEW
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 Patient's aunt sitting at bedside. Patient is still in some post-ictal state, but he is able to answer some of the simple questions. Reportedly, he had tonic-clonic seizures x5 at home followed by post ictal state. No reported tongue bite or bowel/bladder incontinence, no head strike. His seizure had last for about 19 minutes. Had received versed 5mg IM in route by EMS. He denies headache, vomiting, neck pain or stiffness. Aunt says that his Brivaracetam has been recently changed with Oxcarbazepine 300 mg bid. He has been diagnosed with encephalitis and he is antibiotics and prednisone per his aunt. Patient is compliant on his all medications. Otherwise, he denies fever, chills, chest pain, sob, N/V, abdominal pain, diarrhea or dysuria.  He does vape but denies alcohol or substance abuse "/yes

## 2023-11-10 VITALS
RESPIRATION RATE: 18 BRPM | TEMPERATURE: 97 F | SYSTOLIC BLOOD PRESSURE: 128 MMHG | WEIGHT: 248.24 LBS | HEART RATE: 80 BPM | OXYGEN SATURATION: 95 % | DIASTOLIC BLOOD PRESSURE: 74 MMHG

## 2023-11-10 PROCEDURE — 99239 HOSP IP/OBS DSCHRG MGMT >30: CPT

## 2023-11-10 RX ORDER — PREGABALIN 225 MG/1
1 CAPSULE ORAL
Qty: 0 | Refills: 0 | DISCHARGE
Start: 2023-11-10

## 2023-11-10 RX ORDER — QUETIAPINE FUMARATE 200 MG/1
1 TABLET, FILM COATED ORAL
Qty: 0 | Refills: 0 | DISCHARGE
Start: 2023-11-10

## 2023-11-10 RX ORDER — BRIVARACETAM 25 MG/1
1 TABLET, FILM COATED ORAL
Qty: 0 | Refills: 0 | DISCHARGE
Start: 2023-11-10

## 2023-11-10 RX ADMIN — BRIVARACETAM 100 MILLIGRAM(S): 25 TABLET, FILM COATED ORAL at 05:27

## 2023-11-10 RX ADMIN — PANTOPRAZOLE SODIUM 40 MILLIGRAM(S): 20 TABLET, DELAYED RELEASE ORAL at 05:28

## 2023-11-10 RX ADMIN — OXCARBAZEPINE 300 MILLIGRAM(S): 300 TABLET, FILM COATED ORAL at 05:24

## 2023-11-10 RX ADMIN — Medication 15 MILLIGRAM(S): at 05:24

## 2023-11-10 RX ADMIN — LACOSAMIDE 150 MILLIGRAM(S): 50 TABLET ORAL at 05:27

## 2023-11-10 RX ADMIN — Medication 200 MILLIGRAM(S): at 05:24

## 2023-11-10 RX ADMIN — DIVALPROEX SODIUM 750 MILLIGRAM(S): 500 TABLET, DELAYED RELEASE ORAL at 05:22

## 2023-11-10 NOTE — DISCHARGE NOTE PROVIDER - NSDCCPCAREPLAN_GEN_ALL_CORE_FT
PRINCIPAL DISCHARGE DIAGNOSIS  Diagnosis: Seizure  Assessment and Plan of Treatment: Seizurewith breakthrough seizures due to prior encephalitis  CT head  unremarkable  Unable to perform EEG due to intermittent agitated  Unable to perform MRI, but given transfer can be deferred  Neurology consulted and recommended to  Continue with: Briviact 100mg BID, Depakote 750mg BID, Vimpat 150mg BID, and Trileptal 300mg BID.        SECONDARY DISCHARGE DIAGNOSES  Diagnosis: Infectious encephalitis  Assessment and Plan of Treatment: H/O Infectious encephalitis.   Continue with Prednisone 15 mg daily  Off PJP PPx given <20 mg.      Diagnosis: LUIS CARLOS (acute kidney injury)  Assessment and Plan of Treatment: RESOLVED and likely pre-renal.    Diagnosis: Fever  Assessment and Plan of Treatment: Remained Afebrile   WBC improved today  Infectious workup negative so far    Diagnosis: Agitation  Assessment and Plan of Treatment: Agitation in setting of post-ictal state   Haldol 5 mg IM prn or Ativan 2 mg IM   Seroquel qhs.

## 2023-11-10 NOTE — DISCHARGE NOTE PROVIDER - HOSPITAL COURSE
Patient is a 28 year old male with significant PMH of Seizure (on 3 AED) and encephalitis (autoimmune?) was BIBA with seizure episode. Currently post-ictal and agitated and pending transfer to [facility] in [place], [place].    Seizure.   Seizure d/o with breakthrough seizures due to prior encephalitis  CT head on admission unremarkable  Unable to perform EEG due to intermittent agitated  Unable to perform MRI, but given transfer can be deferred  Seizure precautions  Neurology consult appreciated  Continue with: Briviact 100mg BID, Depakote 750mg BID, Vimpat 150mg BID, and Trileptal 300mg BID.    Fever.   Remained Afebrile   WBC improved today  Follow up repeat blood clx, neg so far, neg RVP    Agitation.   Agitation in setting of post-ictal state   Haldol 5 mg IM prn or Ativan 2 mg IM   Seroquel qhs.    H/O Infectious encephalitis.   Continue with Prednisone 15 mg daily  Off PJP PPx given <20 mg.    LUIS CARLOS (acute kidney injury).   RESOLVED and likely pre-renal.    Elevated LFTs.   Mildly elevated LFT's possibly medication induced   Monitor.    Plan for transfer to [facility].

## 2023-11-10 NOTE — CHART NOTE - NSCHARTNOTEFT_GEN_A_CORE
Provider received call transport pending for discharge. Chart review discharge incomplete at this time. Plan discussed with Dr. Vallejo. Patient is medically stable for discharge.

## 2023-11-10 NOTE — DISCHARGE NOTE PROVIDER - NSDCMRMEDTOKEN_GEN_ALL_CORE_FT
benzonatate 100 mg oral capsule: 2 cap(s) orally 3 times a day  brivaracetam 100 mg oral tablet: 1 tab(s) orally 2 times a day  cyanocobalamin 1000 mcg oral tablet: 1 tab(s) orally once a day  Depakote 250 mg oral delayed release tablet: 3 tab(s) orally 2 times a day  OXcarbazepine 300 mg oral tablet: 1 tab(s) orally 2 times a day  pantoprazole 40 mg oral delayed release tablet: 1 tab(s) orally once a day  predniSONE 5 mg oral tablet: 3 tab(s) orally once a day  QUEtiapine 100 mg oral tablet: 1 tab(s) orally once a day (at bedtime)  Vimpat 150 mg oral tablet: 1 tab(s) orally 2 times a day

## 2023-11-10 NOTE — DISCHARGE NOTE NURSING/CASE MANAGEMENT/SOCIAL WORK - NSDCPEFALRISK_GEN_ALL_CORE
For information on Fall & Injury Prevention, visit: [email or web address] OR  [email or web address] OR  https://www.cdc.gov/steadi/patient.html

## 2023-11-10 NOTE — DISCHARGE NOTE NURSING/CASE MANAGEMENT/SOCIAL WORK - PATIENT PORTAL LINK FT
You can access the FollowMyHealth Patient Portal offered by [facility] by registering at the following website: [email or web address]. By joining [employer]’s FollowMyHealth portal, you will also be able to view your health information using other applications (apps) compatible with our system.

## 2023-11-12 LAB
CULTURE RESULTS: SIGNIFICANT CHANGE UP
SPECIMEN SOURCE: SIGNIFICANT CHANGE UP

## 2023-11-13 LAB
CULTURE RESULTS: SIGNIFICANT CHANGE UP
CULTURE RESULTS: SIGNIFICANT CHANGE UP
SPECIMEN SOURCE: SIGNIFICANT CHANGE UP
SPECIMEN SOURCE: SIGNIFICANT CHANGE UP

## 2023-11-14 DIAGNOSIS — G04.81 OTHER ENCEPHALITIS AND ENCEPHALOMYELITIS: ICD-10-CM

## 2023-11-14 DIAGNOSIS — L89.892 PRESSURE ULCER OF OTHER SITE, STAGE 2: ICD-10-CM

## 2023-11-14 DIAGNOSIS — K21.9 GASTRO-ESOPHAGEAL REFLUX DISEASE WITHOUT ESOPHAGITIS: ICD-10-CM

## 2023-11-14 DIAGNOSIS — E86.0 DEHYDRATION: ICD-10-CM

## 2023-11-14 DIAGNOSIS — R56.9 UNSPECIFIED CONVULSIONS: ICD-10-CM

## 2023-11-14 DIAGNOSIS — F06.70 MILD NEUROCOGNITIVE DISORDER DUE TO KNOWN PHYSIOLOGICAL CONDITION WITHOUT BEHAVIORAL DISTURBANCE: ICD-10-CM

## 2023-11-14 DIAGNOSIS — N17.9 ACUTE KIDNEY FAILURE, UNSPECIFIED: ICD-10-CM

## 2023-11-14 DIAGNOSIS — R79.89 OTHER SPECIFIED ABNORMAL FINDINGS OF BLOOD CHEMISTRY: ICD-10-CM

## 2023-11-14 DIAGNOSIS — T50.905A ADVERSE EFFECT OF UNSPECIFIED DRUGS, MEDICAMENTS AND BIOLOGICAL SUBSTANCES, INITIAL ENCOUNTER: ICD-10-CM
